# Patient Record
Sex: FEMALE | Race: WHITE | Employment: OTHER | URBAN - METROPOLITAN AREA
[De-identification: names, ages, dates, MRNs, and addresses within clinical notes are randomized per-mention and may not be internally consistent; named-entity substitution may affect disease eponyms.]

---

## 2017-10-09 ENCOUNTER — THERAPY VISIT (OUTPATIENT)
Dept: PHYSICAL THERAPY | Facility: CLINIC | Age: 67
End: 2017-10-09
Payer: MEDICARE

## 2017-10-09 DIAGNOSIS — M25.511 ACUTE PAIN OF RIGHT SHOULDER: Primary | ICD-10-CM

## 2017-10-09 PROCEDURE — G8984 CARRY CURRENT STATUS: HCPCS | Mod: GP | Performed by: PHYSICAL THERAPIST

## 2017-10-09 PROCEDURE — 97110 THERAPEUTIC EXERCISES: CPT | Mod: GP | Performed by: PHYSICAL THERAPIST

## 2017-10-09 PROCEDURE — G8985 CARRY GOAL STATUS: HCPCS | Mod: GP | Performed by: PHYSICAL THERAPIST

## 2017-10-09 PROCEDURE — 97161 PT EVAL LOW COMPLEX 20 MIN: CPT | Mod: GP | Performed by: PHYSICAL THERAPIST

## 2017-10-09 NOTE — LETTER
DEPARTMENT OF HEALTH AND HUMAN SERVICES  CENTERS FOR MEDICARE & MEDICAID SERVICES    PLAN/UPDATED PLAN OF PROGRESS FOR OUTPATIENT REHABILITATION    PATIENTS NAME:  Rika Connor     : 1950    PROVIDER NUMBER:    [id number]    [id number]     PROVIDER NAME: [place] [facility]    MEDICAL RECORD NUMBER: [id number]     START OF CARE DATE:  SOC Date: 10/09/17   TYPE:  PT    PRIMARY/TREATMENT DIAGNOSIS: (Pertinent Medical Diagnosis)  Acute pain of right shoulder    VISITS FROM START OF CARE:  Rxs Used: 1     [place] [facility] Initial Evaluation    Subjective:    Patient is a 67 year old female presenting with rehab right shoulder hpi. The history is provided by the patient. No  was used.   Rika Connor is a 67 year old female with a right shoulder condition.  Condition occurred with:  A fall.  Condition occurred: in the community.  This is a new condition  Pt presents to PT today with complaint of R shoulder pain, states she slipped and fell on the top stair. States she had a MRI done and has a tear in her rotator cuff. Per referral has a full thickness tear of the supraspinatus and partial tear of the infraspinatus tendon, R shoulder bursitis. Pt currently rating her pain at 2/10 level. She had an injection done on Friday of last week, states the injection helped some. She has pain putting on her coat, using her arm behind her. She is able to elevate her arm overhead if she moves slowly. She followed up with physician on 10-6-17 and was referred to PT for 1-2x/wk for 4-6 weeks. .    Patient reports pain:  Lateral and posterior.  Radiates to:  Shoulder and upper arm.  Pain is described as aching and sharp (with arm movement ) and is intermittent and reported as 2/10 and 5/10.  Associated symptoms:  Loss of motion/stiffness and loss of strength. Pain is worse in the A.M..  Symptoms are exacerbated by using arm behind back,  lifting, using arm overhead and certain positions and relieved by nothing (Reports pain is not that bad).  Since onset symptoms are gradually improving.  Special tests:  MRI.  Previous treatment includes other (Injection ).  There was mild improvement following previous treatment.  General health as reported by patient is good.  Pertinent medical history includes:  High blood pressure.  Medical allergies: yes (Penicillin).  Other surgeries include:  Other (Tonsils ).  Current medications:  None as reported by the patient.  Current occupation is Retired.    Primary job tasks include:  Driving, repetitive tasks and lifting.    Barriers include:  None as reported by the patient.  Red flags:  None as reported by the patient.              Objective:    Standing Alignment:    Cervical/Thoracic:  Forward head  Shoulder/UE:  Rounded shoulders         Shoulder Evaluation:  ROM:  AROM:    Flexion:  Left:  143    Right:  137  Abduction:  Left: 155   Right:  145  External Rotation:  Left:  78    Right:  78  Extension/Internal Rotation:  Left:  T7    Right:  T7      Strength:    Flexion: Left:5/5   Pain:    Right: 5-/5     Pain:   Abduction:  Left: 5/5  Pain:    Right: 5-/5     Pain:  Internal Rotation:  Left:5/5     Pain:    Right: 5/5     Pain:  External Rotation:   Left:5/5     Pain:   Right:5-/5     Pain:      Stability Testing:    Right shoulder stability negative testing:  Sulcus sign; Load and shift anterior and Load and shift posterior    Special Tests:    Right shoulder positive for the following special tests:Labral and Impingement  Right shoulder negative for the following special tests:Bursal; Rotator cuff tear and Acrimioclavicular    Palpation:    Right shoulder tenderness present at: Supraspinatus and Infraspinatus  Right shoulder tenderness not present at:Clavicle; Sternoclavicular; Acrimioclavicular; Biceps; Triceps; Teres Minor; Subscapularis; Deltoid; Levator; Rhomboids; Upper Trap or Bicipital  Groove    Mobility Tests:    Glenohumeral anterior right:  Normal  Glenohumeral posterior right:  Normal  Glenohumeral inferior right:  Normal    Scapulothoracic right:  Normal  Scapulohumeral rhythm right:  Normal         Assessment/Plan:      Patient is a 67 year old female with right side shoulder complaints.    Patient has the following significant findings with corresponding treatment plan.                Diagnosis 1:  R shoulder pain, rotator cuff tear   Pain -  hot/cold therapy, electric stimulation, manual therapy, self management, education, directional preference exercise and home program  Decreased ROM/flexibility - manual therapy, therapeutic exercise and home program  Decreased strength - therapeutic exercise, therapeutic activities and home program  Inflammation - cold therapy, electric stimulation and self management/home program  Impaired muscle performance - neuro re-education and home program  Decreased function - therapeutic activities and home program  Impaired posture - neuro re-education and home program    Therapy Evaluation Codes:   1) History comprised of:   Personal factors that impact the plan of care:      None.    Comorbidity factors that impact the plan of care are:      None.     Medications impacting care: None.  2) Examination of Body Systems comprised of:   Body structures and functions that impact the plan of care:      Shoulder.   Activity limitations that impact the plan of care are:      Driving, Lifting, Throwing and Reaching .  3) Clinical presentation characteristics are:   Stable/Uncomplicated.  4) Decision-Making    Low complexity using standardized patient assessment instrument and/or measureable assessment of functional outcome.  Cumulative Therapy Evaluation is: Low complexity.    Previous and current functional limitations:  (See Goal Flow Sheet for this information)    Short term and Long term goals: (See Goal Flow Sheet for this information)     Communication ability:  " Patient appears to be able to clearly communicate and understand verbal and written communication and follow directions correctly.  Treatment Explanation - The following has been discussed with the patient:   RX ordered/plan of care  Anticipated outcomes  Possible risks and side effects  This patient would benefit from PT intervention to resume normal activities.   Rehab potential is good.    Frequency:  2 X week, once daily  Duration:  for 6 weeks  Discharge Plan:  Achieve all LTG.  Independent in home treatment program.  Reach maximal therapeutic benefit.    Caregiver Signature/Credentials _____________________________ Date ________       Treating Provider: Rocky Chávez PT   I have reviewed and certified the need for these services and plan of treatment while under my care.        PHYSICIAN'S SIGNATURE:   _________________________________________  Date___________   James Lester MD    Certification period:  Beginning of Cert date period: 10/09/17 to  End of Cert period date: 01/06/18         Functional Level Progress Report: Please see attached \"Goal Flow sheet for Functional level.\"    ____X____ Continue Services or       ________ DC Services                Service dates: From  SOC Date: 10/09/17 date to present                         "

## 2017-10-09 NOTE — MR AVS SNAPSHOT
"              After Visit Summary   10/9/2017    Rika Connor    MRN: [id number]           Patient Information     Date Of Birth          1950        Visit Information        Provider Department      10/9/2017 3:10 PM Rocky Chávez PT [facility]        Today's Diagnoses     Acute pain of right shoulder    -  1       Follow-ups after your visit        Who to contact     If you have questions or need follow up information about today's clinic visit or your schedule please contact [facility] directly at [phone].  Normal or non-critical lab and imaging results will be communicated to you by Gazelle[employer], letter or phone within 4 business days after the clinic has received the results. If you do not hear from us within 7 days, please contact the clinic through Gazelle[employer] or phone. If you have a critical or abnormal lab result, we will notify you by phone as soon as possible.  Submit refill requests through [employer] or call your pharmacy and they will forward the refill request to us. Please allow 3 business days for your refill to be completed.          Additional Information About Your Visit        MyChart Information     [employer] lets you send messages to your doctor, view your test results, renew your prescriptions, schedule appointments and more. To sign up, go to [email or web address] . Click on \"Log in\" on the left side of the screen, which will take you to the Welcome page. Then click on \"Sign up Now\" on the right side of the page.     You will be asked to enter the access code listed below, as well as some personal information. Please follow the directions to create your username and password.     Your access code is: [email or web address]  Expires: 2018  3:53 PM     Your access code will  in 90 days. If you need help or a new code, please call your [place] clinic or [phone].        Care EveryWhere " ID     This is your Care EveryWhere ID. This could be used by other organizations to access your [place] medical records  [id number]         Blood Pressure from Last 3 Encounters:   No data found for BP    Weight from Last 3 Encounters:   No data found for Wt              We Performed the Following     HC PT EVAL, LOW COMPLEXITY     JUDI CERT REPORT     JUDI INITIAL EVAL REPORT     THERAPEUTIC EXERCISES        Primary Care Provider    None Specified       No primary provider on file.        Equal Access to Services     [facility]: Hadii aad ku hadasho Sokeithali, waaxda luqadaha, qaybta kaalmada adeegyada, waxay idiin hayaan adeeg pauljohnsontrevor laerickn . So [facility] [phone].    ATENCIÓN: Si habla español, tiene a verde disposición servicios gratuitos de asistencia lingüística. Llame al [phone].    We comply with applicable federal civil rights laws and [place] laws. We do not discriminate on the basis of race, color, national origin, age, disability, sex, sexual orientation, or gender identity.            Thank you!     Thank you for choosing [place] [facility]  for your care. Our goal is always to provide you with excellent care. Hearing back from our patients is one way we can continue to improve our services. Please take a few minutes to complete the written survey that you may receive in the mail after your visit with us. Thank you!             Your Updated Medication List - Protect others around you: Learn how to safely use, store and throw away your medicines at www.disposemymeds.org.      Notice  As of 10/9/2017  3:53 PM    You have not been prescribed any medications.

## 2017-10-09 NOTE — PROGRESS NOTES
[facility] Initial Evaluation      Subjective:    Patient is a 67 year old female presenting with rehab right shoulder hpi. The history is provided by the patient. No  was used.   Rika Connor is a 67 year old female with a right shoulder condition.  Condition occurred with:  A fall.  Condition occurred: in the community.  This is a new condition  Pt presents to PT today with complaint of R shoulder pain, states she slipped and fell on the top stair. States she had a MRI done and has a tear in her rotator cuff. Per referral has a full thickness tear of the supraspinatus and partial tear of the infraspinatus tendon, R shoulder bursitis. Pt currently rating her pain at 2/10 level. She had an injection done on Friday of last week, states the injection helped some. She has pain putting on her coat, using her arm behind her. She is able to elevate her arm overhead if she moves slowly. She followed up with physician on 10-6-17 and was referred to PT for 1-2x/wk for 4-6 weeks. .    Patient reports pain:  Lateral and posterior.  Radiates to:  Shoulder and upper arm.  Pain is described as aching and sharp (with arm movement ) and is intermittent and reported as 2/10 and 5/10.  Associated symptoms:  Loss of motion/stiffness and loss of strength. Pain is worse in the A.M..  Symptoms are exacerbated by using arm behind back, lifting, using arm overhead and certain positions and relieved by nothing (Reports pain is not that bad).  Since onset symptoms are gradually improving.  Special tests:  MRI.  Previous treatment includes other (Injection ).  There was mild improvement following previous treatment.  General health as reported by patient is good.  Pertinent medical history includes:  High blood pressure.  Medical allergies: yes (Penicillin).  Other surgeries include:  Other (Tonsils ).  Current medications:  None as reported by the patient.  Current occupation is Retired.     Primary job tasks include:  Driving, repetitive tasks and lifting.    Barriers include:  None as reported by the patient.    Red flags:  None as reported by the patient.                        Objective:    Standing Alignment:    Cervical/Thoracic:  Forward head  Shoulder/UE:  Rounded shoulders                                       Shoulder Evaluation:  ROM:  AROM:    Flexion:  Left:  143    Right:  137    Abduction:  Left: 155   Right:  145      External Rotation:  Left:  78    Right:  78            Extension/Internal Rotation:  Left:  T7    Right:  T7          Strength:    Flexion: Left:5/5   Pain:    Right: 5-/5     Pain:     Abduction:  Left: 5/5  Pain:    Right: 5-/5     Pain:    Internal Rotation:  Left:5/5     Pain:    Right: 5/5     Pain:  External Rotation:   Left:5/5     Pain:   Right:5-/5     Pain:            Stability Testing:        Right shoulder stability negative testing:  Sulcus sign; Load and shift anterior and Load and shift posterior  Special Tests:      Right shoulder positive for the following special tests:Labral and Impingement  Right shoulder negative for the following special tests:Bursal; Rotator cuff tear and Acrimioclavicular  Palpation:      Right shoulder tenderness present at: Supraspinatus and Infraspinatus  Right shoulder tenderness not present at:Clavicle; Sternoclavicular; Acrimioclavicular; Biceps; Triceps; Teres Minor; Subscapularis; Deltoid; Levator; Rhomboids; Upper Trap or Bicipital Groove  Mobility Tests:    Glenohumeral anterior right:  Normal  Glenohumeral posterior right:  Normal  Glenohumeral inferior right:  Normal      Scapulothoracic right:  Normal    Scapulohumeral rhythm right:  Normal                                   General     ROS    Assessment/Plan:      Patient is a 67 year old female with right side shoulder complaints.    Patient has the following significant findings with corresponding treatment plan.                Diagnosis 1:  R shoulder pain, rotator  cuff tear   Pain -  hot/cold therapy, electric stimulation, manual therapy, self management, education, directional preference exercise and home program  Decreased ROM/flexibility - manual therapy, therapeutic exercise and home program  Decreased strength - therapeutic exercise, therapeutic activities and home program  Inflammation - cold therapy, electric stimulation and self management/home program  Impaired muscle performance - neuro re-education and home program  Decreased function - therapeutic activities and home program  Impaired posture - neuro re-education and home program    Therapy Evaluation Codes:   1) History comprised of:   Personal factors that impact the plan of care:      None.    Comorbidity factors that impact the plan of care are:      None.     Medications impacting care: None.  2) Examination of Body Systems comprised of:   Body structures and functions that impact the plan of care:      Shoulder.   Activity limitations that impact the plan of care are:      Driving, Lifting, Throwing and Reaching .  3) Clinical presentation characteristics are:   Stable/Uncomplicated.  4) Decision-Making    Low complexity using standardized patient assessment instrument and/or measureable assessment of functional outcome.  Cumulative Therapy Evaluation is: Low complexity.    Previous and current functional limitations:  (See Goal Flow Sheet for this information)    Short term and Long term goals: (See Goal Flow Sheet for this information)     Communication ability:  Patient appears to be able to clearly communicate and understand verbal and written communication and follow directions correctly.  Treatment Explanation - The following has been discussed with the patient:   RX ordered/plan of care  Anticipated outcomes  Possible risks and side effects  This patient would benefit from PT intervention to resume normal activities.   Rehab potential is good.    Frequency:  2 X week, once daily  Duration:  for 6  weeks  Discharge Plan:  Achieve all LTG.  Independent in home treatment program.  Reach maximal therapeutic benefit.    Please refer to the daily flowsheet for treatment today, total treatment time and time spent performing 1:1 timed codes.

## 2017-10-16 ENCOUNTER — THERAPY VISIT (OUTPATIENT)
Dept: PHYSICAL THERAPY | Facility: CLINIC | Age: 67
End: 2017-10-16
Payer: MEDICARE

## 2017-10-16 DIAGNOSIS — M25.511 ACUTE PAIN OF RIGHT SHOULDER: ICD-10-CM

## 2017-10-16 PROCEDURE — 97112 NEUROMUSCULAR REEDUCATION: CPT | Mod: GP | Performed by: PHYSICAL THERAPIST

## 2017-10-16 PROCEDURE — 97110 THERAPEUTIC EXERCISES: CPT | Mod: GP | Performed by: PHYSICAL THERAPIST

## 2017-10-19 ENCOUNTER — THERAPY VISIT (OUTPATIENT)
Dept: PHYSICAL THERAPY | Facility: CLINIC | Age: 67
End: 2017-10-19
Payer: MEDICARE

## 2017-10-19 DIAGNOSIS — M25.511 ACUTE PAIN OF RIGHT SHOULDER: ICD-10-CM

## 2017-10-19 PROCEDURE — 97110 THERAPEUTIC EXERCISES: CPT | Mod: GP | Performed by: PHYSICAL THERAPIST

## 2017-10-19 PROCEDURE — 97112 NEUROMUSCULAR REEDUCATION: CPT | Mod: GP | Performed by: PHYSICAL THERAPIST

## 2017-10-23 ENCOUNTER — THERAPY VISIT (OUTPATIENT)
Dept: PHYSICAL THERAPY | Facility: CLINIC | Age: 67
End: 2017-10-23
Payer: MEDICARE

## 2017-10-23 DIAGNOSIS — M25.511 ACUTE PAIN OF RIGHT SHOULDER: ICD-10-CM

## 2017-10-23 PROCEDURE — 97112 NEUROMUSCULAR REEDUCATION: CPT | Mod: GP | Performed by: PHYSICAL THERAPIST

## 2017-10-23 PROCEDURE — 97110 THERAPEUTIC EXERCISES: CPT | Mod: GP | Performed by: PHYSICAL THERAPIST

## 2017-10-23 PROCEDURE — 97140 MANUAL THERAPY 1/> REGIONS: CPT | Mod: GP | Performed by: PHYSICAL THERAPIST

## 2017-10-26 ENCOUNTER — THERAPY VISIT (OUTPATIENT)
Dept: PHYSICAL THERAPY | Facility: CLINIC | Age: 67
End: 2017-10-26
Payer: MEDICARE

## 2017-10-26 DIAGNOSIS — M25.511 ACUTE PAIN OF RIGHT SHOULDER: ICD-10-CM

## 2017-10-26 PROCEDURE — 97140 MANUAL THERAPY 1/> REGIONS: CPT | Mod: GP | Performed by: PHYSICAL THERAPIST

## 2017-10-26 PROCEDURE — 97110 THERAPEUTIC EXERCISES: CPT | Mod: GP | Performed by: PHYSICAL THERAPIST

## 2017-10-31 ENCOUNTER — THERAPY VISIT (OUTPATIENT)
Dept: PHYSICAL THERAPY | Facility: CLINIC | Age: 67
End: 2017-10-31
Payer: MEDICARE

## 2017-10-31 DIAGNOSIS — M25.511 ACUTE PAIN OF RIGHT SHOULDER: ICD-10-CM

## 2017-10-31 PROCEDURE — 97110 THERAPEUTIC EXERCISES: CPT | Mod: GP | Performed by: PHYSICAL THERAPIST

## 2017-10-31 PROCEDURE — 97140 MANUAL THERAPY 1/> REGIONS: CPT | Mod: GP | Performed by: PHYSICAL THERAPIST

## 2017-11-06 ENCOUNTER — THERAPY VISIT (OUTPATIENT)
Dept: PHYSICAL THERAPY | Facility: CLINIC | Age: 67
End: 2017-11-06
Payer: MEDICARE

## 2017-11-06 DIAGNOSIS — M25.511 ACUTE PAIN OF RIGHT SHOULDER: ICD-10-CM

## 2017-11-06 PROCEDURE — 97112 NEUROMUSCULAR REEDUCATION: CPT | Mod: GP | Performed by: PHYSICAL THERAPIST

## 2017-11-06 PROCEDURE — 97110 THERAPEUTIC EXERCISES: CPT | Mod: GP | Performed by: PHYSICAL THERAPIST

## 2017-11-06 PROCEDURE — 97140 MANUAL THERAPY 1/> REGIONS: CPT | Mod: GP | Performed by: PHYSICAL THERAPIST

## 2017-11-13 ENCOUNTER — THERAPY VISIT (OUTPATIENT)
Dept: PHYSICAL THERAPY | Facility: CLINIC | Age: 67
End: 2017-11-13
Payer: MEDICARE

## 2017-11-13 DIAGNOSIS — M25.511 ACUTE PAIN OF RIGHT SHOULDER: ICD-10-CM

## 2017-11-13 PROCEDURE — 97110 THERAPEUTIC EXERCISES: CPT | Mod: GP | Performed by: PHYSICAL THERAPIST

## 2017-11-13 PROCEDURE — 97112 NEUROMUSCULAR REEDUCATION: CPT | Mod: GP | Performed by: PHYSICAL THERAPIST

## 2017-11-13 PROCEDURE — 97140 MANUAL THERAPY 1/> REGIONS: CPT | Mod: GP | Performed by: PHYSICAL THERAPIST

## 2017-11-20 ENCOUNTER — THERAPY VISIT (OUTPATIENT)
Dept: PHYSICAL THERAPY | Facility: CLINIC | Age: 67
End: 2017-11-20
Payer: MEDICARE

## 2017-11-20 DIAGNOSIS — M25.511 ACUTE PAIN OF RIGHT SHOULDER: ICD-10-CM

## 2017-11-20 PROCEDURE — 97110 THERAPEUTIC EXERCISES: CPT | Mod: GP | Performed by: PHYSICAL THERAPIST

## 2017-11-20 PROCEDURE — 97112 NEUROMUSCULAR REEDUCATION: CPT | Mod: GP | Performed by: PHYSICAL THERAPIST

## 2017-11-20 PROCEDURE — 97140 MANUAL THERAPY 1/> REGIONS: CPT | Mod: GP | Performed by: PHYSICAL THERAPIST

## 2017-11-27 ENCOUNTER — THERAPY VISIT (OUTPATIENT)
Dept: PHYSICAL THERAPY | Facility: CLINIC | Age: 67
End: 2017-11-27
Payer: MEDICARE

## 2017-11-27 DIAGNOSIS — M25.511 ACUTE PAIN OF RIGHT SHOULDER: ICD-10-CM

## 2017-11-27 PROCEDURE — 97112 NEUROMUSCULAR REEDUCATION: CPT | Mod: GP | Performed by: PHYSICAL THERAPIST

## 2017-11-27 PROCEDURE — 97110 THERAPEUTIC EXERCISES: CPT | Mod: GP | Performed by: PHYSICAL THERAPIST

## 2017-11-27 PROCEDURE — 97140 MANUAL THERAPY 1/> REGIONS: CPT | Mod: GP | Performed by: PHYSICAL THERAPIST

## 2017-11-27 PROCEDURE — G8985 CARRY GOAL STATUS: HCPCS | Mod: GP | Performed by: PHYSICAL THERAPIST

## 2017-11-27 PROCEDURE — G8984 CARRY CURRENT STATUS: HCPCS | Mod: GP | Performed by: PHYSICAL THERAPIST

## 2017-11-27 NOTE — LETTER
[facility]  [address]  [facility] [id number] [phone]    2017    Re: Rika Connor   :   1950  MRN:  [id number]   REFERRING PHYSICIAN:   James Lester    [facility]  Date of Initial Evaluation:  10/9/17  Visits:  Rxs Used: 10  Reason for Referral:  Acute pain of right shoulder    PROGRESS  REPORT  Progress reporting period is from 10/9/17 to 17.       SUBJECTIVE  Subjective changes noted by patient:  felt good for a couple of days after last rx, still has pain with pushing back witah R arm and doing some quick mov't/lifting motions of R arm to shoulder height, will get some soreness in neck if her shoulder gets sore, now able to sleep on R side, will wake on occasion, no pain with dressing,     Current Pain level: 3/10.     Previous pain level was  NA Initial Pain level:  (2-5/10).   Changes in function:  Yes (See Goal flowsheet attached for changes in current functional level)  Adverse reaction to treatment or activity: activity - quick mov't of R arm, lifting too much with R arm    OBJECTIVE  Changes noted in objective findings:    Objective: AROM: shoulder 160, abd 160, ER(0) 65, IR(0) T9, MMT: shoulder abd 4/5, ER 4+/5, IR 5/5, mild tightness B pec minor, + CRLF R, + neers,  - heart, + corocoid, + cross arm, + full can, - empty can     ASSESSMENT/PLAN  Updated problem list and treatment plan: Diagnosis 1:  R shoulder pain consistent with impingement with possible bone spur  Pain -  hot/cold therapy, manual therapy, self management, education and home program  Decreased ROM/flexibility - manual therapy, therapeutic exercise and home program  Decreased strength - therapeutic exercise, therapeutic activities and home program  Decreased function - therapeutic activities and home program  Impaired posture - neuro re-education and home program  STG/LTGs  have been met or progress has been made towards goals:  Yes (See Goal flow sheet completed today.)  Assessment of Progress: The patient's condition is improving.  Self Management Plans:  Patient has been instructed in a home treatment program.  I have re-evaluated this patient and find that the nature, scope, duration and intensity of the therapy is appropriate for the medical condition of the patient.  Rika continues to require the following intervention to meet STG and LTG's:  PT    Recommendations:  This patient would benefit from continued therapy.     Frequency:  1 X week, once daily  Duration:  for 6 weeks        Thank you for your referral.    INQUIRIES  Therapist: Jax Lundy,PT, DPT, [facility]    [facility]  [address] [place], [place] [address]  [facility] [address]  Phone: [phone]  Fax: [phone]

## 2017-11-27 NOTE — PROGRESS NOTES
Subjective:    HPI                    Objective:    System    Physical Exam    General     ROS    Assessment/Plan:      PROGRESS  REPORT    Progress reporting period is from 10/9/17 to 11/27/17.       SUBJECTIVE  Subjective changes noted by patient:  felt good for a couple of days after last rx, still has pain with pushing back witah R arm and doing some quick mov't/lifting motions of R arm to shoulder height, will get some soreness in neck if her shoulder gets sore, now able to sleep on R side, will wake on occasion, no pain with dressing,     Current Pain level: 3/10.     Previous pain level was  NA Initial Pain level:  (2-5/10).   Changes in function:  Yes (See Goal flowsheet attached for changes in current functional level)  Adverse reaction to treatment or activity: activity - quick mov't of R arm, lifting too much with R arm    OBJECTIVE  Changes noted in objective findings:    Objective: AROM: shoulder 160, abd 160, ER(0) 65, IR(0) T9, MMT: shoulder abd 4/5, ER 4+/5, IR 5/5, mild tightness B pec minor, + CRLF R, + neers,  - heart, + corocoid, + cross arm, + full can, - empty can     ASSESSMENT/PLAN  Updated problem list and treatment plan: Diagnosis 1:  R shoulder pain consistent with impingement with possible bone spur    Pain -  hot/cold therapy, manual therapy, self management, education and home program  Decreased ROM/flexibility - manual therapy, therapeutic exercise and home program  Decreased strength - therapeutic exercise, therapeutic activities and home program  Decreased function - therapeutic activities and home program  Impaired posture - neuro re-education and home program  STG/LTGs have been met or progress has been made towards goals:  Yes (See Goal flow sheet completed today.)  Assessment of Progress: The patient's condition is improving.  Self Management Plans:  Patient has been instructed in a home treatment program.  I have re-evaluated this patient and find that the nature, scope,  duration and intensity of the therapy is appropriate for the medical condition of the patient.  Rika continues to require the following intervention to meet STG and LTG's:  PT    Recommendations:  This patient would benefit from continued therapy.     Frequency:  1 X week, once daily  Duration:  for 6 weeks          Please refer to the daily flowsheet for treatment today, total treatment time and time spent performing 1:1 timed codes.        Jax Lundy,PT, DPT, OCS

## 2017-12-04 ENCOUNTER — THERAPY VISIT (OUTPATIENT)
Dept: PHYSICAL THERAPY | Facility: CLINIC | Age: 67
End: 2017-12-04
Payer: MEDICARE

## 2017-12-04 DIAGNOSIS — M25.511 ACUTE PAIN OF RIGHT SHOULDER: ICD-10-CM

## 2017-12-04 PROCEDURE — 97140 MANUAL THERAPY 1/> REGIONS: CPT | Mod: GP | Performed by: PHYSICAL THERAPIST

## 2017-12-04 PROCEDURE — 97112 NEUROMUSCULAR REEDUCATION: CPT | Mod: GP | Performed by: PHYSICAL THERAPIST

## 2017-12-04 PROCEDURE — 97110 THERAPEUTIC EXERCISES: CPT | Mod: GP | Performed by: PHYSICAL THERAPIST

## 2017-12-11 ENCOUNTER — THERAPY VISIT (OUTPATIENT)
Dept: PHYSICAL THERAPY | Facility: CLINIC | Age: 67
End: 2017-12-11
Payer: MEDICARE

## 2017-12-11 DIAGNOSIS — M25.511 ACUTE PAIN OF RIGHT SHOULDER: ICD-10-CM

## 2017-12-11 PROCEDURE — 97112 NEUROMUSCULAR REEDUCATION: CPT | Mod: GP | Performed by: PHYSICAL THERAPIST

## 2017-12-11 PROCEDURE — 97110 THERAPEUTIC EXERCISES: CPT | Mod: GP | Performed by: PHYSICAL THERAPIST

## 2017-12-11 PROCEDURE — 97140 MANUAL THERAPY 1/> REGIONS: CPT | Mod: GP | Performed by: PHYSICAL THERAPIST

## 2017-12-18 ENCOUNTER — THERAPY VISIT (OUTPATIENT)
Dept: PHYSICAL THERAPY | Facility: CLINIC | Age: 67
End: 2017-12-18
Payer: MEDICARE

## 2017-12-18 DIAGNOSIS — M25.511 ACUTE PAIN OF RIGHT SHOULDER: ICD-10-CM

## 2017-12-18 PROCEDURE — 97140 MANUAL THERAPY 1/> REGIONS: CPT | Mod: GP | Performed by: PHYSICAL THERAPIST

## 2017-12-18 PROCEDURE — 97110 THERAPEUTIC EXERCISES: CPT | Mod: GP | Performed by: PHYSICAL THERAPIST

## 2017-12-18 PROCEDURE — 97112 NEUROMUSCULAR REEDUCATION: CPT | Mod: GP | Performed by: PHYSICAL THERAPIST

## 2017-12-26 ENCOUNTER — THERAPY VISIT (OUTPATIENT)
Dept: PHYSICAL THERAPY | Facility: CLINIC | Age: 67
End: 2017-12-26
Payer: MEDICARE

## 2017-12-26 DIAGNOSIS — M25.511 ACUTE PAIN OF RIGHT SHOULDER: ICD-10-CM

## 2017-12-26 PROCEDURE — 97112 NEUROMUSCULAR REEDUCATION: CPT | Mod: GP | Performed by: PHYSICAL THERAPIST

## 2017-12-26 PROCEDURE — 97110 THERAPEUTIC EXERCISES: CPT | Mod: GP | Performed by: PHYSICAL THERAPIST

## 2017-12-26 PROCEDURE — 97140 MANUAL THERAPY 1/> REGIONS: CPT | Mod: GP | Performed by: PHYSICAL THERAPIST

## 2018-01-05 ENCOUNTER — THERAPY VISIT (OUTPATIENT)
Dept: PHYSICAL THERAPY | Facility: CLINIC | Age: 68
End: 2018-01-05
Payer: MEDICARE

## 2018-01-05 DIAGNOSIS — M25.511 ACUTE PAIN OF RIGHT SHOULDER: ICD-10-CM

## 2018-01-05 PROCEDURE — 97112 NEUROMUSCULAR REEDUCATION: CPT | Mod: GP | Performed by: PHYSICAL THERAPIST

## 2018-01-05 PROCEDURE — 97110 THERAPEUTIC EXERCISES: CPT | Mod: GP | Performed by: PHYSICAL THERAPIST

## 2018-01-05 NOTE — MR AVS SNAPSHOT
After Visit Summary   1/5/2018    Rika Connor    MRN: [id number]           Patient Information     Date Of Birth          1950        Visit Information        Provider Department      1/5/2018 9:40 AM Jax Lundy, PT [facility] Physical Therapy        Today's Diagnoses     Acute pain of right shoulder           Follow-ups after your visit        Your next 10 appointments already scheduled     [place] 15, 2018  9:40 AM CST   JUDI Extremity with Jax Lundy PT   [facility] Physical Therapy ([facility]  )    [address]  [facility] [address]   [phone]            Jan 22, 2018  9:40 AM CST   JUDI Extremity with Jax uLndy PT   [facility] Physical Therapy ([facility]  )    [address]  [facility] [address]   [phone]            Jan 29, 2018  9:40 AM CST   JUDI Extremity with Jax Lundy PT   [facility] ([facility]  )    [address]  [facility] [address]   [phone]              Who to contact     If you have questions or need follow up information about today's clinic visit or your schedule please contact [facility] PHYSICAL THERAPY directly at [phone].  Normal or non-critical lab and imaging results will be communicated to you by MyChart, letter or phone within 4 business days after the clinic has received the results. If you do not hear from us within 7 days, please contact the clinic through MyChart or phone. If you have a critical or abnormal lab result, we will notify you by phone as soon as possible.  Submit refill requests through [employer] or call your pharmacy and they will forward the refill request to us. Please allow 3 business days for your refill to be completed.          Additional Information About Your  "Visit        [employer] Information     [employer] lets you send messages to your doctor, view your test results, renew your prescriptions, schedule appointments and more. To sign up, go to [email or web address] . Click on \"Log in\" on the left side of the screen, which will take you to the Welcome page. Then click on \"Sign up Now\" on the right side of the page.     You will be asked to enter the access code listed below, as well as some personal information. Please follow the directions to create your username and password.     Your access code is: [email or web address]  Expires: 2018  2:53 PM     Your access code will  in 90 days. If you need help or a new code, please call your [place] clinic or [phone].        Care EveryWhere ID     This is your Care EveryWhere ID. This could be used by other organizations to access your [place] medical records  [id number]         Blood Pressure from Last 3 Encounters:   No data found for BP    Weight from Last 3 Encounters:   No data found for Wt              We Performed the Following     NEUROMUSCULAR RE-EDUCATION     THERAPEUTIC EXERCISES        Primary Care Provider Fax #    [facility] [phone]       [address] [place] [address]        Equal Access to Services     LONDON LEA : Hadii clair banueloso Sokeithali, waaxda luqadaha, qaybta kaalmada adeegyada, negro izquierdo. So [facility] [phone].    ATENCIÓN: Si habla español, tiene a verde disposición servicios gratuitos de asistencia lingüística. Llame al [phone].    We comply with applicable federal civil rights laws and [place] laws. We do not discriminate on the basis of race, color, national origin, age, disability, sex, sexual orientation, or gender identity.            Thank you!     Thank you for choosing [facility] PHYSICAL THERAPY  for your care. Our goal is always to provide you with excellent care. Hearing back from " our patients is one way we can continue to improve our services. Please take a few minutes to complete the written survey that you may receive in the mail after your visit with us. Thank you!             Your Updated Medication List - Protect others around you: Learn how to safely use, store and throw away your medicines at www.disposemymeds.org.      Notice  As of 1/5/2018 10:27 AM    You have not been prescribed any medications.

## 2018-01-05 NOTE — LETTER
DEPARTMENT OF HEALTH AND HUMAN SERVICES  CENTERS FOR MEDICARE & MEDICAID SERVICES    PLAN/UPDATED PLAN OF PROGRESS FOR OUTPATIENT REHABILITATION    PATIENTS NAME:  Rika Connor     : 1950    PROVIDER NUMBER:    [id number]    [id number]     PROVIDER NAME: [place] [facility] PHYSICAL THERAPY    MEDICAL RECORD NUMBER: [id number]     START OF CARE DATE:  SOC Date: 10/09/17   TYPE:  PT    PRIMARY/TREATMENT DIAGNOSIS: (Pertinent Medical Diagnosis)  Acute pain of right shoulder    VISITS FROM START OF CARE:  Rxs Used: 15     PROGRESS  REPORT  Progress reporting period is from 17 to 18.       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     Previous pain level was  3-7/10 Initial Pain level:  (2-5/10).   Changes in function:  Yes (See Goal flowsheet attached for changes in current functional level)  Adverse reaction to treatment or activity: activity - doing too much lifting (especially with arm away from her body)    OBJECTIVE  Changes noted in objective findings:    Objective: PROM wnl, AROM: flex 165, abd 172, ER(0) 66, IR(0) T12,   MMT: abd 5-/5 (++), ER 5/5 ,IR 5/5  + full can R, - bursa, - neers, - cross arm, mild hypomobile post and inf capsule R GHJ     ASSESSMENT/PLAN  Updated problem list and treatment plan: Diagnosis 1:  R shoulder pain consistent with partial tear of supraspinatus    Pain -  hot/cold therapy, manual therapy, self management, education and home program  Decreased strength - therapeutic exercise, therapeutic activities and home program  Impaired posture - neuro re-education and home program  STG/LTGs have been met or progress has been made  "towards goals:  Yes (See Goal flow sheet completed today.)  Assessment of Progress: The patient's condition is improving slowly.  Continues to be limited when she increases her activity level with her R arm.  Self Management Plans:  Patient has been instructed in a home treatment program.  I have re-evaluated this patient and find that the nature, scope, duration and intensity of the therapy is appropriate for the medical condition of the patient.  Rika continues to require the following intervention to meet STG and LTG's:  PT    Recommendations:  This patient would benefit from continued therapy.     Frequency:  1 X week, once daily  Duration:  for 2 weeks    Caregiver Signature/Credentials _____________________________ Date ________       Treating Provider: Jax Lundy DPT, OCS   I have reviewed and certified the need for these services and plan of treatment while under my care.        PHYSICIAN'S SIGNATURE:   _________________________________________  Date___________   James Lester MD    Certification period:  Beginning of Cert date period: 01/06/18 to  End of Cert period date: 04/04/18     Functional Level Progress Report: Please see attached \"Goal Flow sheet for Functional level.\"    ____X____ Continue Services or       ________ DC Services                Service dates: From  SOC Date: 10/09/17 date to present                         "

## 2018-01-11 NOTE — PROGRESS NOTES
Subjective:  HPI                    Objective:  System    Physical Exam    General     ROS    Assessment/Plan:    PROGRESS  REPORT    Progress reporting period is from 11/27/17 to 1/5/18.       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     Previous pain level was  3-7/10 Initial Pain level:  (2-5/10).   Changes in function:  Yes (See Goal flowsheet attached for changes in current functional level)  Adverse reaction to treatment or activity: activity - doing too much lifting (especially with arm away from her body)    OBJECTIVE  Changes noted in objective findings:    Objective: PROM wnl, AROM: flex 165, abd 172, ER(0) 66, IR(0) T12,   MMT: abd 5-/5 (++), ER 5/5 ,IR 5/5  + full can R, - bursa, - neers, - cross arm, mild hypomobile post and inf capsule R GHJ     ASSESSMENT/PLAN  Updated problem list and treatment plan: Diagnosis 1:  R shoulder pain consistent with partial tear of supraspinatus    Pain -  hot/cold therapy, manual therapy, self management, education and home program  Decreased strength - therapeutic exercise, therapeutic activities and home program  Impaired posture - neuro re-education and home program  STG/LTGs have been met or progress has been made towards goals:  Yes (See Goal flow sheet completed today.)  Assessment of Progress: The patient's condition is improving slowly.  Continues to be limited when she increases her activity level with her R arm.  Self Management Plans:  Patient has been instructed in a home treatment program.  I have re-evaluated this patient and find that the nature, scope, duration and intensity of the therapy is appropriate for the medical condition of the  patient.  Rika continues to require the following intervention to meet STG and LTG's:  PT    Recommendations:  This patient would benefit from continued therapy.     Frequency:  1 X week, once daily  Duration:  for 2 weeks          Please refer to the daily flowsheet for treatment today, total treatment time and time spent performing 1:1 timed codes.      Jax Lundy,PT, DPT, OCS

## 2018-01-15 ENCOUNTER — THERAPY VISIT (OUTPATIENT)
Dept: PHYSICAL THERAPY | Facility: CLINIC | Age: 68
End: 2018-01-15
Payer: MEDICARE

## 2018-01-15 DIAGNOSIS — M25.511 ACUTE PAIN OF RIGHT SHOULDER: ICD-10-CM

## 2018-01-15 PROCEDURE — 97110 THERAPEUTIC EXERCISES: CPT | Mod: GP | Performed by: PHYSICAL THERAPIST

## 2018-01-15 PROCEDURE — 97112 NEUROMUSCULAR REEDUCATION: CPT | Mod: GP | Performed by: PHYSICAL THERAPIST

## 2018-02-28 ENCOUNTER — THERAPY VISIT (OUTPATIENT)
Dept: PHYSICAL THERAPY | Facility: CLINIC | Age: 68
End: 2018-02-28
Payer: MEDICARE

## 2018-02-28 DIAGNOSIS — M25.511 ACUTE PAIN OF RIGHT SHOULDER: ICD-10-CM

## 2018-02-28 PROCEDURE — 97530 THERAPEUTIC ACTIVITIES: CPT | Mod: GP | Performed by: PHYSICAL THERAPIST

## 2018-02-28 NOTE — MR AVS SNAPSHOT
After Visit Summary   2/28/2018    Rika Connor    MRN: [id number]           Patient Information     Date Of Birth          1950        Visit Information        Provider Department      2/28/2018 4:30 PM Rocky Chávez, PT [place] [facility]        Today's Diagnoses     Acute pain of right shoulder           Follow-ups after your visit        Your next 10 appointments already scheduled     Mar 13, 2018  3:50 PM CDT   (Arrive by 3:35 PM)   JUDI Extremity with Jax Lundy, PT   [place] [facility] ([facility]  )    [address]  [place] [address]   [phone]            Mar 16, 2018 11:40 AM CDT   JUDI Extremity with Jax Lundy PT   [place] [facility]  [facility] ([facility]  )    [address]  [place] [address]   [phone]            Mar 19, 2018  2:30 PM CDT   JUDI Extremity with Rocky Chávez PT   [place] [facility] ([facility]  )    [address]  [place] [address]   [phone]            Mar 22, 2018 12:50 PM CDT   JUDI Extremity with Rocky Chávez PT   [place] [facility] ([facility]  )    [address]  [place] [address]   [phone]            Mar 26, 2018 11:40 AM CDT   JUDI Extremity with Jax Lundy PT   [place] [facility]  [facility] ([facility]  )    [address]  [place] [address]   [phone]            Mar 29, 2018 11:20 AM CDT   JUDI Extremity with Jax Lundy PT   [place] [facility]  [facility] ([facility]  )    [address]  [place] [address]   [phone]              Who to contact     If you have questions or need follow up information about today's clinic visit  "or your schedule please contact [facility] PH[facility]ICAL [facility] directly at [phone].  Normal or non-critical lab and imaging results will be communicated to you by [employer], letter or phone within 4 business days after the clinic has received the results. If you do not hear from us within 7 days, please contact the clinic through MyChart or phone. If you have a critical or abnormal lab result, we will notify you by phone as soon as possible.  Submit refill requests through [employer] or call your pharmacy and they will forward the refill request to us. Please allow 3 business days for your refill to be completed.          Additional Information About Your Visit        [employer] Information     [employer] lets you send messages to your doctor, view your test results, renew your prescriptions, schedule appointments and more. To sign up, go to [email or web address] . Click on \"Log in\" on the left side of the screen, which will take you to the Welcome page. Then click on \"Sign up Now\" on the right side of the page.     You will be asked to enter the access code listed below, as well as some personal information. Please follow the directions to create your username and password.     Your access code is: [email or web address]  Expires: 4/15/2018 11:50 AM     Your access code will  in 90 days. If you need help or a new code, please call your [place] clinic or [phone].        Care EveryWhere ID     This is your Care EveryWhere ID. This could be used by other organizations to access your [place] medical records  [id number]         Blood Pressure from Last 3 Encounters:   No data found for BP    Weight from Last 3 Encounters:   No data found for Wt              We Performed the Following     THERAPEUTIC ACTIVITIES        Primary Care Provider Fax #    [facility] [phone]       [address] [place] [address]        Equal Access to Services     LONDON LEA AH: " Hadii clair Sigalaali, wachuyitada luqadaha, jerod kahima walker, negro laquita myrandasoraya langefelicia paulnavjot laericksoraya timbo. So [facility] [phone].    ATENCIÓN: Si habla español, tiene a verde disposición servicios gratuitos de asistencia lingüística. Llame al [phone].    We comply with applicable federal civil rights laws and [place] laws. We do not discriminate on the basis of race, color, national origin, age, disability, sex, sexual orientation, or gender identity.            Thank you!     Thank you for choosing [facility] PHYSICAL [facility]  for your care. Our goal is always to provide you with excellent care. Hearing back from our patients is one way we can continue to improve our services. Please take a few minutes to complete the written survey that you may receive in the mail after your visit with us. Thank you!             Your Updated Medication List - Protect others around you: Learn how to safely use, store and throw away your medicines at www.disposemymeds.org.      Notice  As of 2/28/2018  5:12 PM    You have not been prescribed any medications.

## 2018-03-13 ENCOUNTER — THERAPY VISIT (OUTPATIENT)
Dept: PHYSICAL THERAPY | Facility: CLINIC | Age: 68
End: 2018-03-13
Payer: MEDICARE

## 2018-03-13 DIAGNOSIS — Z98.890 S/P ROTATOR CUFF REPAIR: ICD-10-CM

## 2018-03-13 DIAGNOSIS — M25.511 ACUTE PAIN OF RIGHT SHOULDER: Primary | ICD-10-CM

## 2018-03-13 PROCEDURE — G8984 CARRY CURRENT STATUS: HCPCS | Mod: GP | Performed by: PHYSICAL THERAPIST

## 2018-03-13 PROCEDURE — 97161 PT EVAL LOW COMPLEX 20 MIN: CPT | Mod: GP | Performed by: PHYSICAL THERAPIST

## 2018-03-13 PROCEDURE — G8985 CARRY GOAL STATUS: HCPCS | Mod: GP | Performed by: PHYSICAL THERAPIST

## 2018-03-13 PROCEDURE — 97110 THERAPEUTIC EXERCISES: CPT | Mod: GP | Performed by: PHYSICAL THERAPIST

## 2018-03-13 NOTE — MR AVS SNAPSHOT
After Visit Summary   3/13/2018    Rika Connor    MRN: [id number]           Patient Information     Date Of Birth          1950        Visit Information        Provider Department      3/13/2018 3:50 PM Jax Lundy, PT [facility] Physical Therapy        Today's Diagnoses     Acute pain of right shoulder    -  1    S/P rotator cuff repair           Follow-ups after your visit        Your next 10 appointments already scheduled     Mar 16, 2018  2:50 PM CDT   JUDI Extremity with Jax Lundy PT   [place] [facility] ([facility]  )    [address]  [facility] [address]   [phone]            Mar 19, 2018  2:30 PM CDT   JUDI Extremity with Rocky Chávez PT   [place] [facility] Physical Therapy ([facility]  )    [address]  [place] [address]   [phone]            Mar 22, 2018 12:50 PM CDT   JUDI Extremity with Rocky Chávez, PT   [facility] Therapy ([facility]  )    [address]  [facility] [address]   [phone]            Mar 26, 2018 11:40 AM CDT   JUDI Extremity with Jax Lundy PT   [facility] Physical Therapy ([facility]  )    [address]  [facility] [address]   [phone]            Mar 29, 2018 11:20 AM CDT   JUDI Extremity with Jax Lundy PT   [facility] Physical Therapy ([facility]  )    [address]  [facility] [address]   [phone]              Who to contact     If you have questions or need follow up information about today's clinic visit or your schedule please contact [place] [facility] PHYSICAL THERAPY directly at [phone].  Normal or non-critical lab and imaging results will be communicated to you by MyChart, letter  "or phone within 4 business days after the clinic has received the results. If you do not hear from us within 7 days, please contact the clinic through [employer] or phone. If you have a critical or abnormal lab result, we will notify you by phone as soon as possible.  Submit refill requests through [employer] or call your pharmacy and they will forward the refill request to us. Please allow 3 business days for your refill to be completed.          Additional Information About Your Visit        [employer] Information     [employer] lets you send messages to your doctor, view your test results, renew your prescriptions, schedule appointments and more. To sign up, go to [email or web address] . Click on \"Log in\" on the left side of the screen, which will take you to the Welcome page. Then click on \"Sign up Now\" on the right side of the page.     You will be asked to enter the access code listed below, as well as some personal information. Please follow the directions to create your username and password.     Your access code is: [email or web address]  Expires: 4/15/2018 12:50 PM     Your access code will  in 90 days. If you need help or a new code, please call your [place] clinic or [phone].        Care EveryWhere ID     This is your Care EveryWhere ID. This could be used by other organizations to access your [place] medical records  [id number]         Blood Pressure from Last 3 Encounters:   No data found for BP    Weight from Last 3 Encounters:   No data found for Wt              We Performed the Following     HC PT EVAL, LOW COMPLEXITY     JUDI CERT REPORT     THERAPEUTIC EXERCISES        Primary Care Provider Fax #    [facility] [phone] [address] [facility] [address]        Equal Access to Services     LONDON LEA : Haddinorah Jarvis, nikita blank, negro rodriguez. So [facility] [phone].    ATENCIÓN: Si habla " español, tiene a verde disposición servicios gratuitos de asistencia lingüística. Ward lebron [phone].    We comply with applicable federal civil rights laws and [place] laws. We do not discriminate on the basis of race, color, national origin, age, disability, sex, sexual orientation, or gender identity.            Thank you!     Thank you for choosing [place] [facility] PHYSICAL [facility]  for your care. Our goal is always to provide you with excellent care. Hearing back from our patients is one way we can continue to improve our services. Please take a few minutes to complete the written survey that you may receive in the mail after your visit with us. Thank you!             Your Updated Medication List - Protect others around you: Learn how to safely use, store and throw away your medicines at www.disposemymeds.org.      Notice  As of 3/13/2018  4:43 PM    You have not been prescribed any medications.

## 2018-03-13 NOTE — LETTER
DEPARTMENT OF HEALTH AND HUMAN SERVICES  CENTERS FOR MEDICARE & MEDICAID SERVICES    PLAN/UPDATED PLAN OF PROGRESS FOR OUTPATIENT REHABILITATION    PATIENTS NAME:  Rika Connor     : 1950    PROVIDER NUMBER:    [id number]    [id number]     PROVIDER NAME: [facility]IC [facility] [place] [facility]    MEDICAL RECORD NUMBER: [id number]     START OF CARE DATE:  SOC Date: 18   TYPE:  PT    PRIMARY/TREATMENT DIAGNOSIS: (Pertinent Medical Diagnosis)     Acute pain of right shoulder  S/P rotator cuff repair    VISITS FROM START OF CARE:  Rxs Used: 1     [facility] Initial Evaluation  Subjective:  Patient is a 67 year old female presenting with rehab right shoulder hpi. The history is provided by the patient. No  was used.   Rika Connor is a 67 year old female with a right shoulder condition.  Condition occurred with:  Repetition/overuse.  Condition occurred: at home.  This is a new condition  S/p R rotator cuff repair (large tear) with decompression, DCE, bicep tenodesis on 3/6/18.  Now taking extra strength Tylenol for pain control.  Sleeping in recliner.  Had stitches removed yesterday.  Hasn't started the codman's exercise yet.  Doing some AROM for R elbow. Icing with cryocuff 2-3x/day. .    Patient reports pain:  Upper arm, in the joint, anterior, posterior, lateral and medial.  Radiates to:  Upper arm.  Pain is described as sharp, stabbing and aching and is intermittent and reported as 9/10.  Associated symptoms:  Loss of motion/stiffness, loss of strength and edema. Pain is the same all the time.  Symptoms are exacerbated by using arm overhead, using arm at shoulder level, carrying, certain positions, using arm behind back, lifting and lying on extremity and relieved by ice.  Since onset symptoms are gradually improving.    Previous treatment includes physical therapy.  There was moderate improvement following previous  treatment.  General health as reported by patient is good.  Pertinent medical history includes:  Osteoarthritis, overweight and menopausal.  Medical allergies: yes (penecillin).  Other surgeries include:  Other (tonsils).  Current medications:  Muscle relaxants and pain medication.  Current occupation is Retired    Has horses, does mounted shooting.  Patient is currently not working due to present treatment problem.  Primary job tasks include:  Repetitive tasks, lifting and prolonged standing.    Barriers include:  None as reported by the patient.  Red flags:  None as reported by the patient.  Objective:    Rika OWENS Nikolas , : 1950, MRN: [id number]    Physical Therapy Objective Findings  Subjective information, goals, clinical impression, daily documentation and other information found in EPISODES tab.  Shoulder Objective Findings  Posture Comments: mild forward head, R shoulder rounded forward  Visual Assessment (atrophy, incision):  Good healing of incisions, no increased warmth, no signs of infection, good pulses  Screens:                                                                     Right                                                    Left                                                    Cervical (ROM, quadrant) Normal ROM Normal ROM   Thoracic Mobility              Range of Motion:                                    Right AROM            Right PROM            Left AROM              Left PROM                Flexion NA 45 wnl    Abduction NA 45 wnl    External Rotation NA 0 in 0 degrees wnl in 0 degrees  in 90 degrees   Internal Rotation NA 25  in 0 degrees wnl in 0 degrees  in 90 degrees   Elbow Flex/Ext 5-120 0-130 wnl      Scapulothoracic Rhythm:     Manual Muscle Testing (graded 0-5, measured at 0 degrees unless otherwise noted):                                                                        Right                                                    Left                                                       Flexion Fair isometric    Abduction Fair isometric    External Rotation good isometric    Internal Rotation Fair isometric    Extension     Mid Trap     Lower Trap     Other:     (+ mild pain, ++ moderate pain, +++ severe pain)    Comments:  Flexibility:                                                                         Right                                                   Left                                                       Pec Minor (supine) Mild tightness Mild tightness   Other     Other       Palpation:  Mild tenderness at incisions  Assessment/Plan:    Patient is a 67 year old female with right side shoulder complaints.    Patient has the following significant findings with corresponding treatment plan.                Diagnosis 1:  S/p R rotator cuff repair (large)  Pain -  hot/cold therapy, manual therapy, self management, education and home program  Decreased ROM/flexibility - manual therapy, therapeutic exercise and home program  Decreased strength - therapeutic exercise, therapeutic activities and home program  Decreased function - therapeutic activities and home program  Impaired posture - neuro re-education and home program    Therapy Evaluation Codes:   1) History comprised of:   Personal factors that impact the plan of care:      Age, Coping style and Time since onset of symptoms.    Comorbidity factors that impact the plan of care are:      Osteoarthritis.     Medications impacting care: Muscle relaxant and Pain.  2) Examination of Body Systems comprised of:   Body structures and functions that impact the plan of care:      Shoulder.   Activity limitations that impact the plan of care are:      Bathing, Cooking, Driving, Lifting, Throwing, Working, Sleeping and Laying down.  3) Clinical presentation characteristics are:   Stable/Uncomplicated.  4) Decision-Making    Low complexity using standardized patient assessment instrument and/or measureable assessment of  "functional outcome.  Cumulative Therapy Evaluation is: Low complexity.    Previous and current functional limitations:  (See Goal Flow Sheet for this information)    Short term and Long term goals: (See Goal Flow Sheet for this information)     Communication ability:  Patient appears to be able to clearly communicate and understand verbal and written communication and follow directions correctly.  Treatment Explanation - The following has been discussed with the patient:   RX ordered/plan of care  Anticipated outcomes  Possible risks and side effects  This patient would benefit from PT intervention to resume normal activities.   Rehab potential is good.    Frequency:  2 X week, once daily, 6 weeks, then 1x/week, 8 weeks, 1x/2 weeks for 8 weeks  Duration:  for 22 weeks  Discharge Plan:  Achieve all LTG.  Independent in home treatment program.  Reach maximal therapeutic benefit.      Caregiver Signature/Credentials _____________________________ Date ________       Treating Provider: Jax WALKERT, OCS   I have reviewed and certified the need for these services and plan of treatment while under my care.        PHYSICIAN'S SIGNATURE:   _________________________________________  Date___________   Mike Byers MD    Certification period:  Beginning of Cert date period: 03/13/18 to  End of Cert period date: 06/10/18     Functional Level Progress Report: Please see attached \"Goal Flow sheet for Functional level.\"    ____X____ Continue Services or       ________ DC Services                Service dates: From  SOC Date: 03/13/18 date to present                         "

## 2018-03-13 NOTE — PROGRESS NOTES
[facility] Initial Evaluation  Subjective:  Patient is a 67 year old female presenting with rehab right shoulder hpi. The history is provided by the patient. No  was used.   Rika Connor is a 67 year old female with a right shoulder condition.  Condition occurred with:  Repetition/overuse.  Condition occurred: at home.  This is a new condition  S/p R rotator cuff repair (large tear) with decompression, DCE, bicep tenodesis on 3/6/18.  Now taking extra strength Tylenol for pain control.  Sleeping in recliner.  Had stitches removed yesterday.  Hasn't started the codman's exercise yet.  Doing some AROM for R elbow. Icing with cryocuff 2-3x/day. .    Patient reports pain:  Upper arm, in the joint, anterior, posterior, lateral and medial.  Radiates to:  Upper arm.  Pain is described as sharp, stabbing and aching and is intermittent and reported as 9/10.  Associated symptoms:  Loss of motion/stiffness, loss of strength and edema. Pain is the same all the time.  Symptoms are exacerbated by using arm overhead, using arm at shoulder level, carrying, certain positions, using arm behind back, lifting and lying on extremity and relieved by ice.  Since onset symptoms are gradually improving.    Previous treatment includes physical therapy.  There was moderate improvement following previous treatment.  General health as reported by patient is good.  Pertinent medical history includes:  Osteoarthritis, overweight and menopausal.  Medical allergies: yes (penecillin).  Other surgeries include:  Other (tonsils).  Current medications:  Muscle relaxants and pain medication.  Current occupation is Retired    Has horses, does mounted shooting.  Patient is currently not working due to present treatment problem.  Primary job tasks include:  Repetitive tasks, lifting and prolonged standing.    Barriers include:  None as reported by the patient.    Red flags:  None as reported by the  patient.                        Objective:  System    Physical Exam    General     ROS  Rika Connor , : 1950, MRN: [id number]    Physical Therapy Objective Findings  Subjective information, goals, clinical impression, daily documentation and other information found in EPISODES tab.    Shoulder Objective Findings  Posture Comments: mild forward head, R shoulder rounded forward  Visual Assessment (atrophy, incision):  Good healing of incisions, no increased warmth, no signs of infection, good pulses  Screens:                                                                     Right                                                    Left                                                    Cervical (ROM, quadrant) Normal ROM Normal ROM   Thoracic Mobility              Range of Motion:                                    Right AROM            Right PROM            Left AROM              Left PROM                Flexion NA 45 wnl    Abduction NA 45 wnl    External Rotation NA 0 in 0 degrees wnl in 0 degrees  in 90 degrees   Internal Rotation NA 25  in 0 degrees wnl in 0 degrees  in 90 degrees   Elbow Flex/Ext 5-120 0-130 wnl      Scapulothoracic Rhythm:     Manual Muscle Testing (graded 0-5, measured at 0 degrees unless otherwise noted):                                                                        Right                                                    Left                                                      Flexion Fair isometric    Abduction Fair isometric    External Rotation good isometric    Internal Rotation Fair isometric    Extension     Mid Trap     Lower Trap     Other:     (+ mild pain, ++ moderate pain, +++ severe pain)    Comments:  Flexibility:                                                                         Right                                                   Left                                                       Pec Minor (supine) Mild tightness Mild tightness    Other     Other          Palpation:  Mild tenderness at incisions      Assessment/Plan:    Patient is a 67 year old female with right side shoulder complaints.    Patient has the following significant findings with corresponding treatment plan.                Diagnosis 1:  S/p R rotator cuff repair (large)    Pain -  hot/cold therapy, manual therapy, self management, education and home program  Decreased ROM/flexibility - manual therapy, therapeutic exercise and home program  Decreased strength - therapeutic exercise, therapeutic activities and home program  Decreased function - therapeutic activities and home program  Impaired posture - neuro re-education and home program    Therapy Evaluation Codes:   1) History comprised of:   Personal factors that impact the plan of care:      Age, Coping style and Time since onset of symptoms.    Comorbidity factors that impact the plan of care are:      Osteoarthritis.     Medications impacting care: Muscle relaxant and Pain.  2) Examination of Body Systems comprised of:   Body structures and functions that impact the plan of care:      Shoulder.   Activity limitations that impact the plan of care are:      Bathing, Cooking, Driving, Lifting, Throwing, Working, Sleeping and Laying down.  3) Clinical presentation characteristics are:   Stable/Uncomplicated.  4) Decision-Making    Low complexity using standardized patient assessment instrument and/or measureable assessment of functional outcome.  Cumulative Therapy Evaluation is: Low complexity.    Previous and current functional limitations:  (See Goal Flow Sheet for this information)    Short term and Long term goals: (See Goal Flow Sheet for this information)     Communication ability:  Patient appears to be able to clearly communicate and understand verbal and written communication and follow directions correctly.  Treatment Explanation - The following has been discussed with the patient:   RX ordered/plan of  care  Anticipated outcomes  Possible risks and side effects  This patient would benefit from PT intervention to resume normal activities.   Rehab potential is good.    Frequency:  2 X week, once daily, 6 weeks, then 1x/week, 8 weeks, 1x/2 weeks for 8 weeks  Duration:  for 22 weeks  Discharge Plan:  Achieve all LTG.  Independent in home treatment program.  Reach maximal therapeutic benefit.    Please refer to the daily flowsheet for treatment today, total treatment time and time spent performing 1:1 timed codes.     Jax Lundy,PT, DPT, OCS

## 2018-03-16 ENCOUNTER — THERAPY VISIT (OUTPATIENT)
Dept: PHYSICAL THERAPY | Facility: CLINIC | Age: 68
End: 2018-03-16
Payer: MEDICARE

## 2018-03-16 DIAGNOSIS — Z98.890 S/P ROTATOR CUFF REPAIR: ICD-10-CM

## 2018-03-16 DIAGNOSIS — M25.511 ACUTE PAIN OF RIGHT SHOULDER: ICD-10-CM

## 2018-03-16 PROCEDURE — 97110 THERAPEUTIC EXERCISES: CPT | Mod: GP | Performed by: PHYSICAL THERAPIST

## 2018-03-16 PROCEDURE — 97140 MANUAL THERAPY 1/> REGIONS: CPT | Mod: GP | Performed by: PHYSICAL THERAPIST

## 2018-03-19 ENCOUNTER — THERAPY VISIT (OUTPATIENT)
Dept: PHYSICAL THERAPY | Facility: CLINIC | Age: 68
End: 2018-03-19
Payer: MEDICARE

## 2018-03-19 DIAGNOSIS — M25.511 ACUTE PAIN OF RIGHT SHOULDER: ICD-10-CM

## 2018-03-19 DIAGNOSIS — Z98.890 S/P ROTATOR CUFF REPAIR: ICD-10-CM

## 2018-03-19 PROCEDURE — 97140 MANUAL THERAPY 1/> REGIONS: CPT | Mod: GP | Performed by: PHYSICAL THERAPIST

## 2018-03-19 PROCEDURE — 97110 THERAPEUTIC EXERCISES: CPT | Mod: GP | Performed by: PHYSICAL THERAPIST

## 2018-03-22 ENCOUNTER — THERAPY VISIT (OUTPATIENT)
Dept: PHYSICAL THERAPY | Facility: CLINIC | Age: 68
End: 2018-03-22
Payer: MEDICARE

## 2018-03-22 DIAGNOSIS — M25.511 ACUTE PAIN OF RIGHT SHOULDER: ICD-10-CM

## 2018-03-22 DIAGNOSIS — Z98.890 S/P ROTATOR CUFF REPAIR: ICD-10-CM

## 2018-03-22 PROCEDURE — 97110 THERAPEUTIC EXERCISES: CPT | Mod: GP | Performed by: PHYSICAL THERAPIST

## 2018-03-26 ENCOUNTER — THERAPY VISIT (OUTPATIENT)
Dept: PHYSICAL THERAPY | Facility: CLINIC | Age: 68
End: 2018-03-26
Payer: MEDICARE

## 2018-03-26 DIAGNOSIS — M25.511 ACUTE PAIN OF RIGHT SHOULDER: ICD-10-CM

## 2018-03-26 DIAGNOSIS — Z98.890 S/P ROTATOR CUFF REPAIR: ICD-10-CM

## 2018-03-26 PROCEDURE — 97140 MANUAL THERAPY 1/> REGIONS: CPT | Mod: GP | Performed by: PHYSICAL THERAPIST

## 2018-03-26 PROCEDURE — 97110 THERAPEUTIC EXERCISES: CPT | Mod: GP | Performed by: PHYSICAL THERAPIST

## 2018-03-29 ENCOUNTER — THERAPY VISIT (OUTPATIENT)
Dept: PHYSICAL THERAPY | Facility: CLINIC | Age: 68
End: 2018-03-29
Payer: MEDICARE

## 2018-03-29 DIAGNOSIS — M25.511 ACUTE PAIN OF RIGHT SHOULDER: ICD-10-CM

## 2018-03-29 DIAGNOSIS — Z98.890 S/P ROTATOR CUFF REPAIR: ICD-10-CM

## 2018-03-29 PROCEDURE — 97110 THERAPEUTIC EXERCISES: CPT | Mod: GP | Performed by: PHYSICAL THERAPIST

## 2018-03-29 PROCEDURE — 97140 MANUAL THERAPY 1/> REGIONS: CPT | Mod: GP | Performed by: PHYSICAL THERAPIST

## 2018-04-02 ENCOUNTER — THERAPY VISIT (OUTPATIENT)
Dept: PHYSICAL THERAPY | Facility: CLINIC | Age: 68
End: 2018-04-02
Payer: MEDICARE

## 2018-04-02 DIAGNOSIS — M25.511 ACUTE PAIN OF RIGHT SHOULDER: ICD-10-CM

## 2018-04-02 DIAGNOSIS — Z98.890 S/P ROTATOR CUFF REPAIR: ICD-10-CM

## 2018-04-02 PROCEDURE — 97140 MANUAL THERAPY 1/> REGIONS: CPT | Mod: GP | Performed by: PHYSICAL THERAPIST

## 2018-04-02 PROCEDURE — 97110 THERAPEUTIC EXERCISES: CPT | Mod: GP | Performed by: PHYSICAL THERAPIST

## 2018-04-05 ENCOUNTER — THERAPY VISIT (OUTPATIENT)
Dept: PHYSICAL THERAPY | Facility: CLINIC | Age: 68
End: 2018-04-05
Payer: MEDICARE

## 2018-04-05 DIAGNOSIS — M25.511 ACUTE PAIN OF RIGHT SHOULDER: ICD-10-CM

## 2018-04-05 DIAGNOSIS — Z98.890 S/P ROTATOR CUFF REPAIR: ICD-10-CM

## 2018-04-05 PROCEDURE — 97110 THERAPEUTIC EXERCISES: CPT | Mod: GP | Performed by: PHYSICAL THERAPIST

## 2018-04-05 PROCEDURE — 97140 MANUAL THERAPY 1/> REGIONS: CPT | Mod: GP | Performed by: PHYSICAL THERAPIST

## 2018-04-10 ENCOUNTER — THERAPY VISIT (OUTPATIENT)
Dept: PHYSICAL THERAPY | Facility: CLINIC | Age: 68
End: 2018-04-10
Payer: MEDICARE

## 2018-04-10 DIAGNOSIS — M25.511 ACUTE PAIN OF RIGHT SHOULDER: ICD-10-CM

## 2018-04-10 DIAGNOSIS — Z98.890 S/P ROTATOR CUFF REPAIR: ICD-10-CM

## 2018-04-10 PROCEDURE — G8986 CARRY D/C STATUS: HCPCS | Mod: GP | Performed by: PHYSICAL THERAPIST

## 2018-04-10 PROCEDURE — 97110 THERAPEUTIC EXERCISES: CPT | Mod: GP | Performed by: PHYSICAL THERAPIST

## 2018-04-10 PROCEDURE — 97140 MANUAL THERAPY 1/> REGIONS: CPT | Mod: GP | Performed by: PHYSICAL THERAPIST

## 2018-04-10 PROCEDURE — G8985 CARRY GOAL STATUS: HCPCS | Mod: GP | Performed by: PHYSICAL THERAPIST

## 2018-04-10 NOTE — LETTER
[facility] ATHLETIC [facility]  [address]  [facility] [address] [phone]    April 10, 2018    Re: Rika Connor   :   1950  MRN:  [id number]   REFERRING PHYSICIAN:   Mike Byers    [facility]  Date of Initial Evaluation:  3/13/18  Visits:  Rxs Used: 9  Reason for Referral:     S/P rotator cuff repair  Acute pain of right shoulder    PROGRESS  REPORT  Progress reporting period is from 3/13/18 to 4/10/18.       SUBJECTIVE  Subjective changes noted by patient:  doing well, sleeping whole night in bed on wedge, stretching progressively getting easier, no problems putting on shirts that button in the front    Current Pain level: 2/10.     Previous pain level was  NA Initial Pain level: 10/10.   Changes in function:  Yes (See Goal flowsheet attached for changes in current functional level)  Adverse reaction to treatment or activity: None    OBJECTIVE  Changes noted in objective findings:    Objective: PROM: flex 120, abd 80, ER(0) 35, elbow AROM wnl, mild hypomobile ant incisions     ASSESSMENT/PLAN  Updated problem list and treatment plan: Diagnosis 1:  S/p R rotator cuff repair (large tear)     Pain -  hot/cold therapy, manual therapy, self management, education and home program  Decreased ROM/flexibility - manual therapy, therapeutic exercise and home program  Decreased function - therapeutic activities and home program  STG/LTGs have been met or progress has been made towards goals:  Yes (See Goal flow sheet completed today.)  Assessment of Progress: The patient's condition is improving.  Self Management Plans:  Patient has been instructed in a home treatment program.  I have re-evaluated this patient and find that the nature, scope, duration and intensity of the therapy is appropriate for the medical condition of the patient.  Rika continues to require the following intervention to meet STG  and LTG's:  PT      Recommendations:  This patient would benefit from continued therapy.     Frequency:  2 X week, once daily, 3 weeks, then 1x/week, 6 weeks  Duration:  for 9 weeks        Thank you for your referral.    INQUIRIES  Therapist: Jax Lundy,PT, DPT, [facility]    [facility] [place] [facility]  [address] [place] [address]. [address]  [facility] [address]  Phone: [phone]  Fax: [phone]

## 2018-04-10 NOTE — MR AVS SNAPSHOT
After Visit Summary   4/10/2018    Rika Connor    MRN: [id number]           Patient Information     Date Of Birth          1950        Visit Information        Provider Department      4/10/2018 12:00 PM Jax Lundy, PT [facility]        Today's Diagnoses     S/P rotator cuff repair        Acute pain of right shoulder           Follow-ups after your visit        Your next 10 appointments already scheduled     Apr 13, 2018  2:50 PM CDT   JUDI Extremity with Jax Lundy, PT   [facility] ([facility]  )    [address]  [facility] [address]   [phone]            Apr 17, 2018 11:20 AM CDT   JUDI Extremity with Jax Lundy, PT   [facility] ([facility]  )    [address]  [facility] [address]   [phone]            Apr 20, 2018  2:50 PM CDT   JUDI Extremity with Jax Lundy, PT   [facility] ([facility]  )    [address]  [facility] [address]   [phone]            Apr 23, 2018  1:30 PM CDT   JUDI Extremity with Jax Lundy, PT   [facility] ([facility]  )    [address]  [facility] [address]   [phone]            Apr 26, 2018 12:10 PM CDT   JUDI Extremity with Rocky Chávez, PT   [facility] ([facility]  )    [address]  [place] [address]   [phone]            Apr 30, 2018  4:10 PM CDT   JUDI Extremity with Jax Lundy, PT   [facility] ([facility]  )    [address]  [facility] [address]   [phone]            May 03, 2018 11:20 AM CDT   JDUI Extremity with Jax Lundy, PT   [facility] [place] [facility] ([facility]  )    [address]  [facility] [address] [phone]              Who to contact     If you have questions or need follow up information about today's clinic visit or your schedule please contact [facility] directly at [phone].  Normal or non-critical lab and imaging results will be communicated to you by MakuCellhart, letter or phone within 4 business days after the clinic has received the results. If you do not hear from us within 7 days, please contact the clinic through MakuCellhart or phone. If you have a critical or abnormal lab result, we will notify you by phone as soon as possible.  Submit refill requests through [employer] or call your pharmacy and they will forward the refill request to us. Please allow 3 business days for your refill to be completed.          Additional Information About Your Visit        M[employer]Cell[employer]ousing Information     [employer]rid lets you send messages to your doctor, view your test results, renew your prescriptions, schedule appointments and more. To sign up, go to [email or web address] . Click on \"Log in\" on the left side of the screen, which will take you to the Welcome page. Then click on \"Sign up Now\" on the right side of the page.     You will be asked to enter the access code listed below, as well as some personal information. Please follow the directions to create your username and password.     Your access code is: [email or web address]  Expires: 4/15/2018 12:50 PM     Your access code will  in 90 days. If you need help or a new code, please call your [place] clinic or [phone].        Care EveryWhere ID     This is your Care EveryWhere ID. This could be used by other organizations to access your [place] medical records  [id number]         Blood Pressure from Last 3 Encounters:   No data found for BP    Weight from Last 3 Encounters:   No data found for Wt    "           We Performed the Following     JUDI PROGRESS NOTES REPORT     MANUAL THER TECH,1+REGIONS,EA 15 MIN     THERAPEUTIC EXERCISES        Primary Care Provider Fax #    [employer] [facility] [phone] [address] [facility] [address]        Equal Access to Services     LONDON LEA : Hadii aad ku hadchristiano Soomaali, wachuyitada luqadaha, qaybta kaalmada adeandres, negro laquita myrandasoraya chappell migeltrevor izquierdo. So [facility] [phone].    ATENCIÓN: Si habla español, tiene a verde disposición servicios gratuitos de asistencia lingüística. Llame al [phone].    We comply with applicable federal civil rights laws and [place] laws. We do not discriminate on the basis of race, color, national origin, age, disability, sex, sexual orientation, or gender identity.            Thank you!     Thank you for choosing [facility]  for your care. Our goal is always to provide you with excellent care. Hearing back from our patients is one way we can continue to improve our services. Please take a few minutes to complete the written survey that you may receive in the mail after your visit with us. Thank you!             Your Updated Medication List - Protect others around you: Learn how to safely use, store and throw away your medicines at www.disposemymeds.org.      Notice  As of 4/10/2018 12:53 PM    You have not been prescribed any medications.

## 2018-04-10 NOTE — PROGRESS NOTES
Subjective:  HPI                    Objective:  System    Physical Exam    General     ROS    Assessment/Plan:    PROGRESS  REPORT    Progress reporting period is from 3/13/18 to 4/10/18.       SUBJECTIVE  Subjective changes noted by patient:  doing well, sleeping whole night in bed on wedge, stretching progressively getting easier, no problems putting on shirts that button in the front    Current Pain level: 2/10.     Previous pain level was  NA Initial Pain level: 10/10.   Changes in function:  Yes (See Goal flowsheet attached for changes in current functional level)  Adverse reaction to treatment or activity: None    OBJECTIVE  Changes noted in objective findings:    Objective: PROM: flex 120, abd 80, ER(0) 35, elbow AROM wnl, mild hypomobile ant incisions     ASSESSMENT/PLAN  Updated problem list and treatment plan: Diagnosis 1:  S/p R rotator cuff repair (large tear)    Pain -  hot/cold therapy, manual therapy, self management, education and home program  Decreased ROM/flexibility - manual therapy, therapeutic exercise and home program  Decreased function - therapeutic activities and home program  STG/LTGs have been met or progress has been made towards goals:  Yes (See Goal flow sheet completed today.)  Assessment of Progress: The patient's condition is improving.  Self Management Plans:  Patient has been instructed in a home treatment program.  I have re-evaluated this patient and find that the nature, scope, duration and intensity of the therapy is appropriate for the medical condition of the patient.  Rika continues to require the following intervention to meet STG and LTG's:  PT    Recommendations:  This patient would benefit from continued therapy.     Frequency:  2 X week, once daily, 3 weeks, then 1x/week, 6 weeks  Duration:  for 9 weeks        Please refer to the daily flowsheet for treatment today, total treatment time and time spent performing 1:1 timed codes.      Jax Lundy,PT, DPT,  OCS

## 2018-04-13 ENCOUNTER — THERAPY VISIT (OUTPATIENT)
Dept: PHYSICAL THERAPY | Facility: CLINIC | Age: 68
End: 2018-04-13
Payer: MEDICARE

## 2018-04-13 DIAGNOSIS — M25.511 ACUTE PAIN OF RIGHT SHOULDER: ICD-10-CM

## 2018-04-13 DIAGNOSIS — Z98.890 S/P ROTATOR CUFF REPAIR: ICD-10-CM

## 2018-04-13 PROCEDURE — 97110 THERAPEUTIC EXERCISES: CPT | Mod: GP | Performed by: PHYSICAL THERAPIST

## 2018-04-17 ENCOUNTER — THERAPY VISIT (OUTPATIENT)
Dept: PHYSICAL THERAPY | Facility: CLINIC | Age: 68
End: 2018-04-17
Payer: MEDICARE

## 2018-04-17 DIAGNOSIS — Z98.890 S/P ROTATOR CUFF REPAIR: ICD-10-CM

## 2018-04-17 DIAGNOSIS — M25.511 ACUTE PAIN OF RIGHT SHOULDER: ICD-10-CM

## 2018-04-17 PROCEDURE — 97140 MANUAL THERAPY 1/> REGIONS: CPT | Mod: GP | Performed by: PHYSICAL THERAPIST

## 2018-04-17 PROCEDURE — 97110 THERAPEUTIC EXERCISES: CPT | Mod: GP | Performed by: PHYSICAL THERAPIST

## 2018-04-20 ENCOUNTER — THERAPY VISIT (OUTPATIENT)
Dept: PHYSICAL THERAPY | Facility: CLINIC | Age: 68
End: 2018-04-20
Payer: MEDICARE

## 2018-04-20 DIAGNOSIS — Z98.890 S/P ROTATOR CUFF REPAIR: ICD-10-CM

## 2018-04-20 DIAGNOSIS — M25.511 ACUTE PAIN OF RIGHT SHOULDER: ICD-10-CM

## 2018-04-20 PROCEDURE — 97140 MANUAL THERAPY 1/> REGIONS: CPT | Mod: GP | Performed by: PHYSICAL THERAPIST

## 2018-04-20 PROCEDURE — 97110 THERAPEUTIC EXERCISES: CPT | Mod: GP | Performed by: PHYSICAL THERAPIST

## 2018-04-23 ENCOUNTER — THERAPY VISIT (OUTPATIENT)
Dept: PHYSICAL THERAPY | Facility: CLINIC | Age: 68
End: 2018-04-23
Payer: MEDICARE

## 2018-04-23 DIAGNOSIS — Z98.890 S/P ROTATOR CUFF REPAIR: ICD-10-CM

## 2018-04-23 DIAGNOSIS — M25.511 ACUTE PAIN OF RIGHT SHOULDER: ICD-10-CM

## 2018-04-23 PROCEDURE — 97110 THERAPEUTIC EXERCISES: CPT | Mod: GP | Performed by: PHYSICAL THERAPIST

## 2018-04-23 PROCEDURE — 97140 MANUAL THERAPY 1/> REGIONS: CPT | Mod: GP | Performed by: PHYSICAL THERAPIST

## 2018-04-30 ENCOUNTER — THERAPY VISIT (OUTPATIENT)
Dept: PHYSICAL THERAPY | Facility: CLINIC | Age: 68
End: 2018-04-30
Payer: MEDICARE

## 2018-04-30 DIAGNOSIS — M25.511 ACUTE PAIN OF RIGHT SHOULDER: ICD-10-CM

## 2018-04-30 DIAGNOSIS — Z98.890 S/P ROTATOR CUFF REPAIR: ICD-10-CM

## 2018-04-30 PROCEDURE — 97110 THERAPEUTIC EXERCISES: CPT | Mod: GP | Performed by: PHYSICAL THERAPIST

## 2018-04-30 PROCEDURE — 97112 NEUROMUSCULAR REEDUCATION: CPT | Mod: GP | Performed by: PHYSICAL THERAPIST

## 2018-05-08 ENCOUNTER — THERAPY VISIT (OUTPATIENT)
Dept: PHYSICAL THERAPY | Facility: CLINIC | Age: 68
End: 2018-05-08
Payer: MEDICARE

## 2018-05-08 DIAGNOSIS — Z98.890 S/P ROTATOR CUFF REPAIR: ICD-10-CM

## 2018-05-08 DIAGNOSIS — M25.511 ACUTE PAIN OF RIGHT SHOULDER: ICD-10-CM

## 2018-05-08 PROCEDURE — 97530 THERAPEUTIC ACTIVITIES: CPT | Mod: GP | Performed by: PHYSICAL THERAPIST

## 2018-05-08 PROCEDURE — 97110 THERAPEUTIC EXERCISES: CPT | Mod: GP | Performed by: PHYSICAL THERAPIST

## 2018-05-14 ENCOUNTER — THERAPY VISIT (OUTPATIENT)
Dept: PHYSICAL THERAPY | Facility: CLINIC | Age: 68
End: 2018-05-14
Payer: MEDICARE

## 2018-05-14 DIAGNOSIS — M25.511 ACUTE PAIN OF RIGHT SHOULDER: ICD-10-CM

## 2018-05-14 DIAGNOSIS — Z98.890 S/P ROTATOR CUFF REPAIR: ICD-10-CM

## 2018-05-14 PROCEDURE — 97110 THERAPEUTIC EXERCISES: CPT | Mod: GP | Performed by: PHYSICAL THERAPIST

## 2018-05-14 PROCEDURE — 97112 NEUROMUSCULAR REEDUCATION: CPT | Mod: GP | Performed by: PHYSICAL THERAPIST

## 2018-05-22 ENCOUNTER — THERAPY VISIT (OUTPATIENT)
Dept: PHYSICAL THERAPY | Facility: CLINIC | Age: 68
End: 2018-05-22
Payer: MEDICARE

## 2018-05-22 DIAGNOSIS — M25.511 ACUTE PAIN OF RIGHT SHOULDER: ICD-10-CM

## 2018-05-22 DIAGNOSIS — Z98.890 S/P ROTATOR CUFF REPAIR: ICD-10-CM

## 2018-05-22 PROCEDURE — 97112 NEUROMUSCULAR REEDUCATION: CPT | Mod: GP | Performed by: PHYSICAL THERAPIST

## 2018-05-22 PROCEDURE — 97110 THERAPEUTIC EXERCISES: CPT | Mod: GP | Performed by: PHYSICAL THERAPIST

## 2018-05-29 ENCOUNTER — THERAPY VISIT (OUTPATIENT)
Dept: PHYSICAL THERAPY | Facility: CLINIC | Age: 68
End: 2018-05-29
Payer: MEDICARE

## 2018-05-29 DIAGNOSIS — Z98.890 S/P ROTATOR CUFF REPAIR: ICD-10-CM

## 2018-05-29 DIAGNOSIS — M25.511 ACUTE PAIN OF RIGHT SHOULDER: ICD-10-CM

## 2018-05-29 PROCEDURE — G8984 CARRY CURRENT STATUS: HCPCS | Mod: GP | Performed by: PHYSICAL THERAPIST

## 2018-05-29 PROCEDURE — 97110 THERAPEUTIC EXERCISES: CPT | Mod: GP | Performed by: PHYSICAL THERAPIST

## 2018-05-29 PROCEDURE — 97112 NEUROMUSCULAR REEDUCATION: CPT | Mod: KX | Performed by: PHYSICAL THERAPIST

## 2018-05-29 PROCEDURE — G8985 CARRY GOAL STATUS: HCPCS | Mod: GP | Performed by: PHYSICAL THERAPIST

## 2018-05-29 NOTE — PROGRESS NOTES
Subjective:  HPI                    Objective:  System    Physical Exam    General     ROS    Assessment/Plan:    PROGRESS  REPORT    Progress reporting period is from 4/10/18 to 5/29/18.       SUBJECTIVE  Subjective changes noted by patient:  typically doing well, she did have a couple of days when she got sore, she thinks she might have over done things a little bit, dressing upper body with ease, able to lay on her R side for a short time    Current Pain level: 0/10 (worst 3/10).     Previous pain level was  NA Initial Pain level: 10/10.   Changes in function:  Yes (See Goal flowsheet attached for changes in current functional level)  Adverse reaction to treatment or activity: activity - using her R arm too much    OBJECTIVE  Changes noted in objective findings:    Objective: PROM: flex 165, abd 135, ER(90) 60, IR(90) 45, AROM: flex 110, abd 99, ER(0) 40, IR(0) L5, MMT: functionally 3-/5 all motions     ASSESSMENT/PLAN  Updated problem list and treatment plan: Diagnosis 1:  S/p R rotator cuff repair (large)    Pain -  hot/cold therapy, manual therapy, self management, education and home program  Decreased ROM/flexibility - manual therapy, therapeutic exercise and home program  Decreased strength - therapeutic exercise, therapeutic activities and home program  Decreased function - therapeutic activities and home program  STG/LTGs have been met or progress has been made towards goals:  Yes (See Goal flow sheet completed today.)  Assessment of Progress: The patient's condition is improving.  Self Management Plans:  Patient has been instructed in a home treatment program.  I have re-evaluated this patient and find that the nature, scope, duration and intensity of the therapy is appropriate for the medical condition of the patient.  Rika continues to require the following intervention to meet STG and LTG's:  PT    Recommendations:  This patient would benefit from continued therapy.     Frequency:  1 X week, once  daily  Duration:  for 8 weeks        Please refer to the daily flowsheet for treatment today, total treatment time and time spent performing 1:1 timed codes.    Jax Lundy,PT, DPT, OCS

## 2018-05-29 NOTE — LETTER
[facility]  [address]  [facility] [address] [phone]    May 29, 2018    Re: Rika Connor   :   1950  MRN:  [id number]   REFERRING PHYSICIAN:   Mike Byers    [facility]  Date of Initial Evaluation:   Visits:  Rxs Used: 18  Reason for Referral:     S/P rotator cuff repair  Acute pain of right shoulder    PROGRESS  REPORT  Progress reporting period is from 4/10/18 to 18.       SUBJECTIVE  Subjective changes noted by patient:  typically doing well, she did have a couple of days when she got sore, she thinks she might have over done things a little bit, dressing upper body with ease, able to lay on her R side for a short time    Current Pain level: 0/10 (worst 3/10).     Previous pain level was  NA Initial Pain level: 10/10.   Changes in function:  Yes (See Goal flowsheet attached for changes in current functional level)  Adverse reaction to treatment or activity: activity - using her R arm too much    OBJECTIVE  Changes noted in objective findings:    Objective: PROM: flex 165, abd 135, ER(90) 60, IR(90) 45, AROM: flex 110, abd 99, ER(0) 40, IR(0) L5, MMT: functionally 3-/5 all motions     ASSESSMENT/PLAN  Updated problem list and treatment plan: Diagnosis 1:  S/p R rotator cuff repair (large)  Pain -  hot/cold therapy, manual therapy, self management, education and home program  Decreased ROM/flexibility - manual therapy, therapeutic exercise and home program  Decreased strength - therapeutic exercise, therapeutic activities and home program  Decreased function - therapeutic activities and home program  STG/LTGs have been met or progress has been made towards goals:  Yes (See Goal flow sheet completed today.)  Assessment of Progress: The patient's condition is improving.  Self Management Plans:  Patient has been instructed in a home treatment program.  I have  re-evaluated this patient and find that the nature, scope, duration and intensity of the therapy is appropriate for the medical condition of the patient.  Rika continues to require the following intervention to meet STG and LTG's:  PT        Recommendations:  This patient would benefit from continued therapy.     Frequency:  1 X week, once daily  Duration:  for 8 weeks      Thank you for your referral.    INQUIRIES  Therapist: Jax Lundy,PT, DPT, [facility]    [facility]  [address] [place], [place] [address]  [facility] [address]  Phone: [phone]  Fax: [phone]

## 2018-05-29 NOTE — LETTER
DEPARTMENT OF HEALTH AND HUMAN SERVICES  CENTERS FOR MEDICARE & MEDICAID SERVICES    PLAN/UPDATED PLAN OF PROGRESS FOR OUTPATIENT REHABILITATION    PATIENTS NAME:  Rika Connor     : 1950    PROVIDER NUMBER:    [id number]    [id number]     PROVIDER NAME: [place] [facility]    MEDICAL RECORD NUMBER: [id number]     START OF CARE DATE:  SOC Date: 18   TYPE:  PT    PRIMARY/TREATMENT DIAGNOSIS: (Pertinent Medical Diagnosis)     S/P rotator cuff repair  Acute pain of right shoulder    VISITS FROM START OF CARE:  Rxs Used: 18     PROGRESS  REPORT  Progress reporting period is from 4/10/18 to 18.       SUBJECTIVE  Subjective changes noted by patient:  typically doing well, she did have a couple of days when she got sore, she thinks she might have over done things a little bit, dressing upper body with ease, able to lay on her R side for a short time    Current Pain level: 0/10 (worst 3/10).     Previous pain level was  NA Initial Pain level: 10/10.   Changes in function:  Yes (See Goal flowsheet attached for changes in current functional level)  Adverse reaction to treatment or activity: activity - using her R arm too much    OBJECTIVE  Changes noted in objective findings:    Objective: PROM: flex 165, abd 135, ER(90) 60, IR(90) 45, AROM: flex 110, abd 99, ER(0) 40, IR(0) L5, MMT: functionally 3-/5 all motions     ASSESSMENT/PLAN  Updated problem list and treatment plan: Diagnosis 1:  S/p R rotator cuff repair (large)  Pain -  hot/cold therapy, manual therapy, self management, education and home program  Decreased ROM/flexibility - manual therapy, therapeutic exercise and home program  Decreased strength - therapeutic exercise, therapeutic activities and home program  Decreased function - therapeutic activities and home program  STG/LTGs have been met or progress has been made towards goals:  Yes (See Goal flow sheet completed today.)  Assessment of  "Progress: The patient's condition is improving.  Self Management Plans:  Patient has been instructed in a home treatment program.  I have re-evaluated this patient and find that the nature, scope, duration and intensity of the therapy is appropriate for the medical condition of the patient.  Rika continues to require the following intervention to meet STG and LTG's:  PT    Recommendations:  This patient would benefit from continued therapy.     Frequency:  1 X week, once daily  Duration:  for 8 weeks    Caregiver Signature/Credentials _____________________________ Date ________       Treating Provider: Jax Lundy DPT, OCS   I have reviewed and certified the need for these services and plan of treatment while under my care.        PHYSICIAN'S SIGNATURE:   _________________________________________  Date___________   Mike Byers MD    Certification period:  Beginning of Cert date period: 05/29/18 to  End of Cert period date: 08/25/18     Functional Level Progress Report: Please see attached \"Goal Flow sheet for Functional level.\"    ____X____ Continue Services or       ________ DC Services                Service dates: From  SOC Date: 03/13/18 date to present                         "

## 2018-05-29 NOTE — MR AVS SNAPSHOT
After Visit Summary   5/29/2018    Rika Connor    MRN: [id number]           Patient Information     Date Of Birth          1950        Visit Information        Provider Department      5/29/2018 1:50 PM Jax Lundy, PT [facility] Physical Therapy        Today's Diagnoses     S/P rotator cuff repair        Acute pain of right shoulder           Follow-ups after your visit        Your next 10 appointments already scheduled     Jun 05, 2018  2:30 PM CDT   JUDI Extremity with Jax Lundy PT   [facility] Physical Therapy ([facility]  )    [address]  [place] [address]   [phone]            Jun 12, 2018 11:20 AM CDT   JUDI Extremity with aJx Lundy PT   [facility] Physical Therapy ([facility]  )    [address]  [place] [address]   [phone]            Jun 19, 2018 11:20 AM CDT   JUDI Extremity with Jax Lundy PT   [facility] Physical Therapy ([facility]  )    [address]  [place] [address]   [phone]            Jun 25, 2018 11:00 AM CDT   JUDI Extremity with Jax Lundy PT   [facility] Physical Therapy ([facility]  )    [address]  [place] [address]   [phone]              Who to contact     If you have questions or need follow up information about today's clinic visit or your schedule please contact [place] [facility] directly at [phone].  Normal or non-critical lab and imaging results will be communicated to you by MyChart, letter or phone within 4 business days after the clinic has received the results. If you do not hear from us within 7 days, please contact the clinic through MyChart or phone. If you have a critical or abnormal lab result, we will  "notify you by phone as soon as possible.  Submit refill requests through [employer] or call your pharmacy and they will forward the refill request to us. Please allow 3 business days for your refill to be completed.          Additional Information About Your Visit        [employer] Information     [employer] lets you send messages to your doctor, view your test results, renew your prescriptions, schedule appointments and more. To sign up, go to [email or web address] . Click on \"Log in\" on the left side of the screen, which will take you to the Welcome page. Then click on \"Sign up Now\" on the right side of the page.     You will be asked to enter the access code listed below, as well as some personal information. Please follow the directions to create your username and password.     Your access code is: [email or web address]  Expires: 2018 12:04 PM     Your access code will  in 90 days. If you need help or a new code, please call your [place] clinic or [phone].        Care EveryWhere ID     This is your Care EveryWhere ID. This could be used by other organizations to access your [place] medical records  [id number]         Blood Pressure from Last 3 Encounters:   No data found for BP    Weight from Last 3 Encounters:   No data found for Wt              We Performed the Following     JUDI PROGRESS NOTES REPORT     NEUROMUSCULAR RE-EDUCATION     THERAPEUTIC EXERCISES        Primary Care Provider Fax #    [facility] [phone]       [address] [place] [id number]        Equal Access to Services     LONDON LEA : Hadii clair banueloso Somarly, waaxda luqadaha, qaybta kaalmada adeegyada, negro izquierdo. So [facility] [phone].    ATENCIÓN: Si habla español, tiene a verde disposición servicios gratuitos de asistencia lingüística. Wrad al [phone].    We comply with applicable federal civil rights laws and [place] laws. We do not discriminate on the basis of " race, color, national origin, age, disability, sex, sexual orientation, or gender identity.            Thank you!     Thank you for choosing [facility]  for your care. Our goal is always to provide you with excellent care. Hearing back from our patients is one way we can continue to improve our services. Please take a few minutes to complete the written survey that you may receive in the mail after your visit with us. Thank you!             Your Updated Medication List - Protect others around you: Learn how to safely use, store and throw away your medicines at www.disposemymeds.org.      Notice  As of 5/29/2018  2:30 PM    You have not been prescribed any medications.

## 2018-06-01 NOTE — PROGRESS NOTES
Subjective:  HPI                    Objective:  System    Physical Exam    General     ROS    Assessment/Plan:    DISCHARGE REPORT    Progress reporting period is from 1/5/18 to 2/28/18.       SUBJECTIVE  Subjective changes noted by patient:  Pt presents today for pre op taching prior to rotator cuff repair on the R shoulder. Pt with complete tear of the suradspinatus and partial tear infraspinatus. Pain at rest at 0/10 level, difficulty functioally working in the paddock, reeaching out to side, behind back and occasionally overhead.     Current Pain level: 0/10 (Pain with activity up to 3/10).     Previous pain level was  2-6/10 Initial Pain level:  (2-5/10).   Changes in function:  Yes (See Goal flowsheet attached for changes in current functional level)  Adverse reaction to treatment or activity: activity - doing too much activity with R arm    OBJECTIVE  Changes noted in objective findings:    MMT: shoulder abd 4+/5 (+), ER (4+/5, IR 4+/5, mild + neers, mild + heart, - corocoid, + cross over      ASSESSMENT/PLAN  Updated problem list and treatment plan: Diagnosis 1:  R shoulder pain, found to have rotator cuff tear large    Pain -  home program  Decreased function - home program  STG/LTGs have been met or progress has been made towards goals:  Yes (See Goal flow sheet completed today.)  Assessment of Progress: The patient's progress has plateaued.  Self Management Plans:  Patient has been instructed in a home treatment program.  I have re-evaluated this patient and find that the nature, scope, duration and intensity of the therapy is appropriate for the medical condition of the patient.  Rika continues to require the following intervention to meet STG and LTG's:  PT intervention is no longer required to meet STG/LTG.    Recommendations:  This patient is ready to be discharged from therapy and continue their home treatment program.  Pt to have surgery for rotator cuff repair.    Please refer to the daily  flowsheet for treatment today, total treatment time and time spent performing 1:1 timed codes.    Jax Lundy,PT, DPT, OCS

## 2018-06-05 ENCOUNTER — THERAPY VISIT (OUTPATIENT)
Dept: PHYSICAL THERAPY | Facility: CLINIC | Age: 68
End: 2018-06-05
Payer: MEDICARE

## 2018-06-05 DIAGNOSIS — M25.511 ACUTE PAIN OF RIGHT SHOULDER: ICD-10-CM

## 2018-06-05 DIAGNOSIS — Z98.890 S/P ROTATOR CUFF REPAIR: ICD-10-CM

## 2018-06-05 PROCEDURE — 97112 NEUROMUSCULAR REEDUCATION: CPT | Mod: KX | Performed by: PHYSICAL THERAPIST

## 2018-06-05 PROCEDURE — 97110 THERAPEUTIC EXERCISES: CPT | Mod: KX | Performed by: PHYSICAL THERAPIST

## 2018-06-12 ENCOUNTER — THERAPY VISIT (OUTPATIENT)
Dept: PHYSICAL THERAPY | Facility: CLINIC | Age: 68
End: 2018-06-12
Payer: MEDICARE

## 2018-06-12 DIAGNOSIS — M25.511 ACUTE PAIN OF RIGHT SHOULDER: ICD-10-CM

## 2018-06-12 DIAGNOSIS — Z98.890 S/P ROTATOR CUFF REPAIR: ICD-10-CM

## 2018-06-12 PROCEDURE — 97530 THERAPEUTIC ACTIVITIES: CPT | Mod: GP | Performed by: PHYSICAL THERAPIST

## 2018-06-12 PROCEDURE — 97140 MANUAL THERAPY 1/> REGIONS: CPT | Mod: GP | Performed by: PHYSICAL THERAPIST

## 2018-06-12 PROCEDURE — 97112 NEUROMUSCULAR REEDUCATION: CPT | Mod: GP | Performed by: PHYSICAL THERAPIST

## 2018-06-26 ENCOUNTER — THERAPY VISIT (OUTPATIENT)
Dept: PHYSICAL THERAPY | Facility: CLINIC | Age: 68
End: 2018-06-26
Payer: MEDICARE

## 2018-06-26 DIAGNOSIS — M25.511 ACUTE PAIN OF RIGHT SHOULDER: ICD-10-CM

## 2018-06-26 DIAGNOSIS — Z98.890 S/P ROTATOR CUFF REPAIR: ICD-10-CM

## 2018-06-26 PROCEDURE — 97110 THERAPEUTIC EXERCISES: CPT | Mod: KX | Performed by: PHYSICAL THERAPIST

## 2018-06-26 PROCEDURE — 97140 MANUAL THERAPY 1/> REGIONS: CPT | Mod: KX | Performed by: PHYSICAL THERAPIST

## 2018-06-26 PROCEDURE — 97112 NEUROMUSCULAR REEDUCATION: CPT | Mod: KX | Performed by: PHYSICAL THERAPIST

## 2018-07-16 ENCOUNTER — THERAPY VISIT (OUTPATIENT)
Dept: PHYSICAL THERAPY | Facility: CLINIC | Age: 68
End: 2018-07-16
Payer: MEDICARE

## 2018-07-16 DIAGNOSIS — Z98.890 S/P ROTATOR CUFF REPAIR: ICD-10-CM

## 2018-07-16 DIAGNOSIS — M25.511 ACUTE PAIN OF RIGHT SHOULDER: ICD-10-CM

## 2018-07-16 PROCEDURE — 97110 THERAPEUTIC EXERCISES: CPT | Mod: GP | Performed by: PHYSICAL THERAPIST

## 2018-07-16 PROCEDURE — 97112 NEUROMUSCULAR REEDUCATION: CPT | Mod: GP | Performed by: PHYSICAL THERAPIST

## 2018-07-16 PROCEDURE — 97140 MANUAL THERAPY 1/> REGIONS: CPT | Mod: GP | Performed by: PHYSICAL THERAPIST

## 2018-07-30 ENCOUNTER — THERAPY VISIT (OUTPATIENT)
Dept: PHYSICAL THERAPY | Facility: CLINIC | Age: 68
End: 2018-07-30
Payer: MEDICARE

## 2018-07-30 DIAGNOSIS — M25.511 ACUTE PAIN OF RIGHT SHOULDER: ICD-10-CM

## 2018-07-30 DIAGNOSIS — Z98.890 S/P ROTATOR CUFF REPAIR: ICD-10-CM

## 2018-07-30 PROCEDURE — G8985 CARRY GOAL STATUS: HCPCS | Mod: GP | Performed by: PHYSICAL THERAPIST

## 2018-07-30 PROCEDURE — 97110 THERAPEUTIC EXERCISES: CPT | Mod: GP | Performed by: PHYSICAL THERAPIST

## 2018-07-30 PROCEDURE — G8986 CARRY D/C STATUS: HCPCS | Mod: GP | Performed by: PHYSICAL THERAPIST

## 2018-07-30 PROCEDURE — 97112 NEUROMUSCULAR REEDUCATION: CPT | Mod: KX | Performed by: PHYSICAL THERAPIST

## 2018-07-30 NOTE — PROGRESS NOTES
Subjective:  HPI                    Objective:  System    Physical Exam    General     ROS    Assessment/Plan:    DISCHARGE REPORT    Progress reporting period is from 5/29/18 to 7/30/18.       SUBJECTIVE  Subjective changes noted by patient:  doing well, no problems with laying on R side now, able to carry gal of milk with R hand, no problems with carrying grocery bags in R hand, was able to ride horse 2x withotu pain    Current Pain level: 0/10 (worst 1-2/10).     Previous pain level was  0-3/10 Initial Pain level: 10/10.   Changes in function:  Yes (See Goal flowsheet attached for changes in current functional level)  Adverse reaction to treatment or activity: None    OBJECTIVE  Changes noted in objective findings:    Objective: R shoulder PROM: flex 170, abd 170, ER(90) 80, IR(90) 50, R shoulder AROM: flex: 157, abd: 155, ER: 63, IR: T8, MMT: abd 4+/5, ER 4+/5, IR 4+/5, biceps 4+/5, triceps 4+/5, middle trap 4+/5, lower trap 4/5     ASSESSMENT/PLAN  Updated problem list and treatment plan: Diagnosis 1:  S/p rotator cuff  Repair    Decreased ROM/flexibility - home program  Decreased strength - home program  STG/LTGs have been met or progress has been made towards goals:  Yes (See Goal flow sheet completed today.)  Assessment of Progress: The patient has met all of their long term goals.  Self Management Plans:  Patient has been instructed in a home treatment program.  I have re-evaluated this patient and find that the nature, scope, duration and intensity of the therapy is appropriate for the medical condition of the patient.  Rika continues to require the following intervention to meet STG and LTG's:  PT intervention is no longer required to meet STG/LTG.    Recommendations:  This patient is ready to be discharged from therapy and continue their home treatment program.    Please refer to the daily flowsheet for treatment today, total treatment time and time spent performing 1:1 timed codes.      Jax  Olmscheid,PT, DPT, OCS

## 2018-07-30 NOTE — LETTER
[facility]  [address]  [facility] [address] [phone]    2018    Re: Rika Connor   :   1950  MRN:  [id number]   REFERRING PHYSICIAN:   Mike Byers    [facility]  Date of Initial Evaluation:  18  Visits:  Rxs Used: 28  Reason for Referral:     S/P rotator cuff repair  Acute pain of right shoulder    DISCHARGE REPORT  Progress reporting period is from 3/13/18 to 18.       SUBJECTIVE  Subjective changes noted by patient:  doing well, no problems with laying on R side now, able to carry gal of milk with R hand, no problems with carrying grocery bags in R hand, was able to ride horse 2x withotu pain    Current Pain level: 0/10 (worst 1-2/10).     Previous pain level was  0-3/10 Initial Pain level: 10/10.   Changes in function:  Yes (See Goal flowsheet attached for changes in current functional level)  Adverse reaction to treatment or activity: None    OBJECTIVE  Changes noted in objective findings:    Objective: R shoulder PROM: flex 170, abd 170, ER(90) 80, IR(90) 50, R shoulder AROM: flex: 157, abd: 155, ER: 63, IR: T8, MMT: abd 4+/5, ER 4+/5, IR 4+/5, biceps 4+/5, triceps 4+/5, middle trap 4+/5, lower trap 4/5     ASSESSMENT/PLAN  Updated problem list and treatment plan: Diagnosis 1:  S/p rotator cuff  Repair  Decreased ROM/flexibility - home program  Decreased strength - home program  STG/LTGs have been met or progress has been made towards goals:  Yes (See Goal flow sheet completed today.)  Assessment of Progress: The patient has met all of their long term goals.  Self Management Plans:  Patient has been instructed in a home treatment program.  I have re-evaluated this patient and find that the nature, scope, duration and intensity of the therapy is appropriate for the medical condition of the patient.  Rika continues to require the following  intervention to meet STG and LTG's:  PT intervention is no longer required to meet STG/LTG.    Recommendations:  This patient is ready to be discharged from therapy and continue their home treatment program.            Thank you for your referral.    INQUIRIES  Therapist: Jax Lundy,PT, DPT, [facility]    [facility]  [address] [place] [address]. [address]  [facility] [address]  Phone: [phone]  Fax: [phone]

## 2018-07-30 NOTE — MR AVS SNAPSHOT
After Visit Summary   7/30/2018    Rika Connor    MRN: [id number]           Patient Information     Date Of Birth          1950        Visit Information        Provider Department      7/30/2018 11:00 AM Jax Lundy PT [facility]        Today's Diagnoses     S/P rotator cuff repair        Acute pain of right shoulder           Follow-ups after your visit        Who to contact     If you have questions or need follow up information about today's clinic visit or your schedule please contact [facility] directly at [phone].  Normal or non-critical lab and imaging results will be communicated to you by MyChart, letter or phone within 4 business days after the clinic has received the results. If you do not hear from us within 7 days, please contact the clinic through MyChart or phone. If you have a critical or abnormal lab result, we will notify you by phone as soon as possible.  Submit refill requests through [employer] or call your pharmacy and they will forward the refill request to us. Please allow 3 business days for your refill to be completed.          Additional Information About Your Visit        Care EveryWhere ID     This is your Care EveryWhere ID. This could be used by other organizations to access your [address] medical records  [id number]         Blood Pressure from Last 3 Encounters:   No data found for BP    Weight from Last 3 Encounters:   No data found for Wt              We Performed the Following     JUDI PROGRESS NOTES REPORT     NEUROMUSCULAR RE-EDUCATION     THERAPEUTIC EXERCISES        Primary Care Provider Fax #    [facility] [phone] [address] [address]        Equal Access to Services     LONDON LEA : Josselin Jarvis, nikita blank, qanegro blum  lashayla izquierdo. So [facility] [phone].    ATENCIÓN: Si habla cecilio, tiene a verde disposición servicios gratuitos de asistencia lingüística. Llame al [phone].    We comply with applicable federal civil rights laws and [place] laws. We do not discriminate on the basis of race, color, national origin, age, disability, sex, sexual orientation, or gender identity.            Thank you!     Thank you for choosing [facility]  for your care. Our goal is always to provide you with excellent care. Hearing back from our patients is one way we can continue to improve our services. Please take a few minutes to complete the written survey that you may receive in the mail after your visit with us. Thank you!             Your Updated Medication List - Protect others around you: Learn how to safely use, store and throw away your medicines at www.disposemymeds.org.      Notice  As of 7/30/2018  1:06 PM    You have not been prescribed any medications.

## 2018-12-10 ENCOUNTER — THERAPY VISIT (OUTPATIENT)
Dept: PHYSICAL THERAPY | Facility: CLINIC | Age: 68
End: 2018-12-10
Payer: MEDICARE

## 2018-12-10 DIAGNOSIS — M54.16 LUMBAR RADICULOPATHY: Primary | ICD-10-CM

## 2018-12-10 PROCEDURE — G8979 MOBILITY GOAL STATUS: HCPCS | Mod: GP | Performed by: PHYSICAL THERAPIST

## 2018-12-10 PROCEDURE — 97161 PT EVAL LOW COMPLEX 20 MIN: CPT | Mod: GP | Performed by: PHYSICAL THERAPIST

## 2018-12-10 PROCEDURE — 97140 MANUAL THERAPY 1/> REGIONS: CPT | Mod: GP | Performed by: PHYSICAL THERAPIST

## 2018-12-10 PROCEDURE — G8978 MOBILITY CURRENT STATUS: HCPCS | Mod: GP | Performed by: PHYSICAL THERAPIST

## 2018-12-10 NOTE — LETTER
"DEPARTMENT OF HEALTH AND HUMAN SERVICES  CENTERS FOR MEDICARE & MEDICAID SERVICES    PLAN/UPDATED PLAN OF PROGRESS FOR OUTPATIENT REHABILITATION    PATIENTS NAME:  Rika Connor     : 1950    PROVIDER NUMBER:    [id number]    [facility]       [facility] [place] [facility]    MEDICAL RECORD NUMBER: [id number]     START OF CARE DATE:  SOC Date: 12/10/18   TYPE:  PT    PRIMARY/TREATMENT DIAGNOSIS: (Pertinent Medical Diagnosis)  Lumbar radiculopathy    VISITS FROM START OF CARE:  Rxs Used: 1     [place] [facility] Initial Evaluation  Subjective:  Rohit has been dealing with low back pain since .  Original injury happened with bending forward to  some paper from to floor and she felt a \"tear\" in her low back.  Imaging showed herniated discs in low back.  She went through PT and was able to eliminate her pain with doing extension based exercises.  She did have some constant numbness in S1 dermatome after original injury.  The pain and numbness in L leg returned again around Oct 2018.  New imaging has shown herniated disc with pressure on L5 nerve root.  Now she has constant numbness in L foot.  She denies any weakness.  She does notice that she is limping if she walks ~30 min.        The history is provided by the patient. No  was used.   Rika Connor is a 68 year old female with a lumbar condition.  Condition occurred with:  Insidious onset.  Condition occurred: at home.  This is a recurrent condition     Patient reports pain:  Central lumbar spine.  Radiates to:  Foot left.  Pain is described as aching and is constant and reported as 2/10.  Associated symptoms:  Numbness, loss of balance and loss of motion/stiffness. Pain is the same all the time (related to activity - cleaning).  Symptoms are exacerbated by lifting, bending, walking and twisting and relieved by activity/movement (limiting lifting).  Since " onset symptoms are unchanged.  Special tests:  MRI (compression L L5 nerve root).  Previous treatment includes physical therapy.  There was significant improvement following previous treatment.  General health as reported by patient is good.  Pertinent medical history includes:  Overweight and high blood pressure.  Medical allergies: yes (penecillin).  Other surgeries include:  Orthopedic surgery (R rotator cuff).  Current medications:  High blood pressure medication (cholesterol).  Current occupation is Retired    Pt goal: improve walking tolerance, minimize numbness in leg (maximize balance)    .    Primary job tasks include:  Repetitive tasks, prolonged sitting and prolonged standing.    Barriers include:  None as reported by the patient.    Red flags:  None as reported by the patient.                        Objective:  System    Physical Exam    General     ROS  Rika Connor , : 1950, MRN: [id number]    Physical Therapy Objective Findings  Subjective information, goals, clinical impression, daily documentation and other information found in EPISODES tab.  Objective:     Lumbar Pain    Posture: flat thoracic spine, decreased lumbar lordosis  Gait:  normal  Lumbar Range of Motion:  Flexion                                               90%                                                                                                                            Extension 10%   Right Side Bending 25%   Left Side Bending 25%   Repeated extension- standing Improved lumbar extension, no change in numbness   Repeated flexion- standing No change with 20 reps     Pelvic screen:                                                                         Positive                                            Negative                                             Standing Forward Bend X L    Gillet(March) X L    Supine to sit     Sacroiliac provocation test     Pubic symphysis provocation            -resisted hip add at  45     Other:       Hip Screen:                                                                  Positive                                             Negative                                             Hip ROM     Oma CHRISTIANSON     Other:     Manual Muscle Testing (graded 0-5, measured at 0 degrees unless otherwise noted):                                                                              Right                                  Left                                                     Transversus Abdominus     -Kirk Leg Lowering (deg)     Hip Flex L2 4+ 4   Hip Abd     Hip Add     Hip Ext     Knee Flex 4- 4-   Knee Ext L3 5 5   Ankle Dorsiflexion L4 5 5   Great Toe Extension L5 5 5   Ankle Plantar Flexion S1 5 4   Other:     (+ mild pain, ++ moderate pain, +++ severe pain)    Special Tests:                                                                     Positive                                             Negative                                             Sign of Buttock  x   SLR  x   Terry Test  x   Ely Test X B    Prone instability Test  x   Crossover SLR X L    Repeated extension prone     Other: JOSELITO 3 min       Flexibility:                                                              Right                                                 Left                                                      Hamstring SLR 90 SLR 90   Hip flexor normal normal   Quadricep Prone kf 110 Prone kf 110   Radha's     piriformis normal normal   Other:            Segmental Mobility: hypomobile T6-T10    Palpation: L ASIS inf, L PSIS sup, R sacral sulcus deeper, L MARIA VICTORIA inf    -If symptoms past hip, must do neuro testing  Dermatome/Sensory  Testing: decreased L L5 and S1 dermatome  Reflex Testing:                                                                 Right                                                  Left                                                     Patellar Tendon normal normal   Achilles  Tendon normal Decreased   Babinski         Assessment/Plan:    Patient is a 68 year old female with lumbar complaints.    Patient has the following significant findings with corresponding treatment plan.                Diagnosis 1:  Low back pain consistent with L5 and S1 n root compression L  Pain -  hot/cold therapy, manual therapy, self management, education, directional preference exercise and home program  Decreased ROM/flexibility - manual therapy, therapeutic exercise, therapeutic activity and home program  Decreased joint mobility - manual therapy, therapeutic exercise, therapeutic activity and home program  Decreased strength - therapeutic exercise, therapeutic activities and home program  Decreased function - therapeutic activities and home program  Impaired posture - neuro re-education, therapeutic activities and home program    Therapy Evaluation Codes:   1) History comprised of:   Personal factors that impact the plan of care:      Age, Past/current experiences and Time since onset of symptoms.    Comorbidity factors that impact the plan of care are:      Osteoarthritis and Overweight.     Medications impacting care: None.  2) Examination of Body Systems comprised of:   Body structures and functions that impact the plan of care:      Lumbar spine.   Activity limitations that impact the plan of care are:      Bending, Lifting, Sitting, Standing and Walking.  3) Clinical presentation characteristics are:   Stable/Uncomplicated.  4) Decision-Making    Low complexity using standardized patient assessment instrument and/or measureable assessment of functional outcome.  Cumulative Therapy Evaluation is: Low complexity.    Previous and current functional limitations:  (See Goal Flow Sheet for this information)    Short term and Long term goals: (See Goal Flow Sheet for this information)     Communication ability:  Patient appears to be able to clearly communicate and understand verbal and written communication  "and follow directions correctly.  Treatment Explanation - The following has been discussed with the patient:   RX ordered/plan of care  Anticipated outcomes  Possible risks and side effects  This patient would benefit from PT intervention to resume normal activities.   Rehab potential is good.    Frequency:  1 X week, once daily  Duration:  for 6 weeks  Discharge Plan:  Achieve all LTG.  Independent in home treatment program.  Reach maximal therapeutic benefit.      Caregiver Signature/Credentials _____________________________ Date ________       Treating Provider: Jax Lundy DPT, OCS   I have reviewed and certified the need for these services and plan of treatment while under my care.        PHYSICIAN'S SIGNATURE:   _________________________________________  Date___________   Mahnaz Jeronimo MD    Certification period:  Beginning of Cert date period: 12/10/18 to  End of Cert period date: 02/18/19     Functional Level Progress Report: Please see attached \"Goal Flow sheet for Functional level.\"    ____X____ Continue Services or       ________ DC Services                Service dates: From  SOC Date: 12/10/18 date to present                         "

## 2018-12-10 NOTE — PROGRESS NOTES
"[facility] Initial Evaluation  Subjective:  Rohit has been dealing with low back pain since 1996.  Original injury happened with bending forward to  some paper from to floor and she felt a \"tear\" in her low back.  Imaging showed herniated discs in low back.  She went through PT and was able to eliminate her pain with doing extension based exercises.  She did have some constant numbness in S1 dermatome after original injury.  The pain and numbness in L leg returned again around Oct 2018.  New imaging has shown herniated disc with pressure on L5 nerve root.  Now she has constant numbness in L foot.  She denies any weakness.  She does notice that she is limping if she walks ~30 min.        The history is provided by the patient. No  was used.   Rika Connor is a 68 year old female with a lumbar condition.  Condition occurred with:  Insidious onset.  Condition occurred: at home.  This is a recurrent condition     Patient reports pain:  Central lumbar spine.  Radiates to:  Foot left.  Pain is described as aching and is constant and reported as 2/10.  Associated symptoms:  Numbness, loss of balance and loss of motion/stiffness. Pain is the same all the time (related to activity - cleaning).  Symptoms are exacerbated by lifting, bending, walking and twisting and relieved by activity/movement (limiting lifting).  Since onset symptoms are unchanged.  Special tests:  MRI (compression L L5 nerve root).  Previous treatment includes physical therapy.  There was significant improvement following previous treatment.  General health as reported by patient is good.  Pertinent medical history includes:  Overweight and high blood pressure.  Medical allergies: yes (penecillin).  Other surgeries include:  Orthopedic surgery (R rotator cuff).  Current medications:  High blood pressure medication (cholesterol).  Current occupation is Retired    Pt goal: improve walking tolerance, " minimize numbness in leg (maximize balance)    .    Primary job tasks include:  Repetitive tasks, prolonged sitting and prolonged standing.    Barriers include:  None as reported by the patient.    Red flags:  None as reported by the patient.                        Objective:  System    Physical Exam    General     ROS  Rika Connor , : 1950, MRN: [id number]    Physical Therapy Objective Findings  Subjective information, goals, clinical impression, daily documentation and other information found in EPISODES tab.  Objective:     Lumbar Pain    Posture: flat thoracic spine, decreased lumbar lordosis  Gait:  normal  Lumbar Range of Motion:  Flexion                                               90%                                                                                                                            Extension 10%   Right Side Bending 25%   Left Side Bending 25%   Repeated extension- standing Improved lumbar extension, no change in numbness   Repeated flexion- standing No change with 20 reps     Pelvic screen:                                                                         Positive                                            Negative                                             Standing Forward Bend X L    Gillet(March) X L    Supine to sit     Sacroiliac provocation test     Pubic symphysis provocation            -resisted hip add at 47     Other:       Hip Screen:                                                                  Positive                                             Negative                                             Hip ROM     Oma CHRISTIANSON     Other:     Manual Muscle Testing (graded 0-5, measured at 0 degrees unless otherwise noted):                                                                              Right                                  Left                                                     Transversus Abdominus     -Kirk Leg  Lowering (deg)     Hip Flex L2 4+ 4   Hip Abd     Hip Add     Hip Ext     Knee Flex 4- 4-   Knee Ext L3 5 5   Ankle Dorsiflexion L4 5 5   Great Toe Extension L5 5 5   Ankle Plantar Flexion S1 5 4   Other:     (+ mild pain, ++ moderate pain, +++ severe pain)    Special Tests:                                                                     Positive                                             Negative                                             Sign of Buttock  x   SLR  x   Terry Test  x   Ely Test X B    Prone instability Test  x   Crossover SLR X L    Repeated extension prone     Other: JOSELITO 3 min       Flexibility:                                                              Right                                                 Left                                                      Hamstring SLR 90 SLR 90   Hip flexor normal normal   Quadricep Prone kf 110 Prone kf 110   Radha's     piriformis normal normal   Other:            Segmental Mobility: hypomobile T6-T10    Palpation: L ASIS inf, L PSIS sup, R sacral sulcus deeper, L MARIA VICTORIA inf    -If symptoms past hip, must do neuro testing  Dermatome/Sensory  Testing: decreased L L5 and S1 dermatome  Reflex Testing:                                                                 Right                                                  Left                                                     Patellar Tendon normal normal   Achilles Tendon normal Decreased   Babinski         Assessment/Plan:    Patient is a 68 year old female with lumbar complaints.    Patient has the following significant findings with corresponding treatment plan.                Diagnosis 1:  Low back pain consistent with L5 and S1 n root compression L  Pain -  hot/cold therapy, manual therapy, self management, education, directional preference exercise and home program  Decreased ROM/flexibility - manual therapy, therapeutic exercise, therapeutic activity and home program  Decreased joint mobility -  manual therapy, therapeutic exercise, therapeutic activity and home program  Decreased strength - therapeutic exercise, therapeutic activities and home program  Decreased function - therapeutic activities and home program  Impaired posture - neuro re-education, therapeutic activities and home program    Therapy Evaluation Codes:   1) History comprised of:   Personal factors that impact the plan of care:      Age, Past/current experiences and Time since onset of symptoms.    Comorbidity factors that impact the plan of care are:      Osteoarthritis and Overweight.     Medications impacting care: None.  2) Examination of Body Systems comprised of:   Body structures and functions that impact the plan of care:      Lumbar spine.   Activity limitations that impact the plan of care are:      Bending, Lifting, Sitting, Standing and Walking.  3) Clinical presentation characteristics are:   Stable/Uncomplicated.  4) Decision-Making    Low complexity using standardized patient assessment instrument and/or measureable assessment of functional outcome.  Cumulative Therapy Evaluation is: Low complexity.    Previous and current functional limitations:  (See Goal Flow Sheet for this information)    Short term and Long term goals: (See Goal Flow Sheet for this information)     Communication ability:  Patient appears to be able to clearly communicate and understand verbal and written communication and follow directions correctly.  Treatment Explanation - The following has been discussed with the patient:   RX ordered/plan of care  Anticipated outcomes  Possible risks and side effects  This patient would benefit from PT intervention to resume normal activities.   Rehab potential is good.    Frequency:  1 X week, once daily  Duration:  for 6 weeks  Discharge Plan:  Achieve all LTG.  Independent in home treatment program.  Reach maximal therapeutic benefit.    Please refer to the daily flowsheet for treatment today, total treatment time  and time spent performing 1:1 timed codes.     Jax Lundy,PT, DPT, OCS

## 2018-12-10 NOTE — LETTER
"DEPARTMENT OF HEALTH AND HUMAN SERVICES  CENTERS FOR MEDICARE & MEDICAID SERVICES    PLAN/UPDATED PLAN OF PROGRESS FOR OUTPATIENT REHABILITATION    PATIENTS NAME:  Rika Connor     : 1950    PROVIDER NUMBER:    [id number]    HICN:   [id number]    PROVIDER NAME: [facility]    MEDICAL RECORD NUMBER: [id number]     START OF CARE DATE:  SOC Date: 12/10/18   TYPE:  PT    PRIMARY/TREATMENT DIAGNOSIS: (Pertinent Medical Diagnosis)  Lumbar radiculopathy    VISITS FROM START OF CARE:  Rxs Used: 1     [place] [facility] Initial Evaluation  Subjective:  Rohit has been dealing with low back pain since .  Original injury happened with bending forward to  some paper from to floor and she felt a \"tear\" in her low back.  Imaging showed herniated discs in low back.  She went through PT and was able to eliminate her pain with doing extension based exercises.  She did have some constant numbness in S1 dermatome after original injury.  The pain and numbness in L leg returned again around Oct 2018.  New imaging has shown herniated disc with pressure on L5 nerve root.  Now she has constant numbness in L foot.  She denies any weakness.  She does notice that she is limping if she walks ~30 min.      The history is provided by the patient. No  was used.   Rika Connor is a 68 year old female with a lumbar condition.  Condition occurred with:  Insidious onset.  Condition occurred: at home.  This is a recurrent condition     Patient reports pain:  Central lumbar spine.  Radiates to:  Foot left.  Pain is described as aching and is constant and reported as 2/10.  Associated symptoms:  Numbness, loss of balance and loss of motion/stiffness. Pain is the same all the time (related to activity - cleaning).  Symptoms are exacerbated by lifting, bending, walking and twisting and relieved by activity/movement (limiting lifting).  Since " onset symptoms are unchanged.  Special tests:  MRI (compression L L5 nerve root).  Previous treatment includes physical therapy.  There was significant improvement following previous treatment.  General health as reported by patient is good.  Pertinent medical history includes:  Overweight and high blood pressure.  Medical allergies: yes (penecillin).  Other surgeries include:  Orthopedic surgery (R rotator cuff).  Current medications:  High blood pressure medication (cholesterol).  Current occupation is Retired    Pt goal: improve walking tolerance, minimize numbness in leg (maximize balance)  Primary job tasks include:  Repetitive tasks, prolonged sitting and prolonged standing.    Barriers include:  None as reported by the patient.  Red flags:  None as reported by the patient.  Objective:    Rika OWENS Nikolas , : 1950, MRN: [id number]    Physical Therapy Objective Findings  Subjective information, goals, clinical impression, daily documentation and other information found in EPISODES tab.  Objective:     Lumbar Pain    Posture: flat thoracic spine, decreased lumbar lordosis  Gait:  normal  Lumbar Range of Motion:  Flexion                                               90%                                                                                                                            Extension 10%   Right Side Bending 25%   Left Side Bending 25%   Repeated extension- standing Improved lumbar extension, no change in numbness   Repeated flexion- standing No change with 20 reps     Pelvic screen:                                                                         Positive                                            Negative                                             Standing Forward Bend X L    Gillet(March) X L    Supine to sit     Sacroiliac provocation test     Pubic symphysis provocation            -resisted hip add at 47     Other:       Hip Screen:                                                                   Positive                                             Negative                                             Hip ROM     Oma CHRISTIANSON     Other:     Manual Muscle Testing (graded 0-5, measured at 0 degrees unless otherwise noted):                                                                              Right                                  Left                                                     Transversus Abdominus     -Kirk Leg Lowering (deg)     Hip Flex L2 4+ 4   Hip Abd     Hip Add     Hip Ext     Knee Flex 4- 4-   Knee Ext L3 5 5   Ankle Dorsiflexion L4 5 5   Great Toe Extension L5 5 5   Ankle Plantar Flexion S1 5 4   Other:     (+ mild pain, ++ moderate pain, +++ severe pain)    Special Tests:                                                                     Positive                                             Negative                                             Sign of Buttock  x   SLR  x   Terry Test  x   Ely Test X B    Prone instability Test  x   Crossover SLR X L    Repeated extension prone     Other: JOSELITO 3 min       Flexibility:                                                              Right                                                 Left                                                      Hamstring SLR 90 SLR 90   Hip flexor normal normal   Quadricep Prone kf 110 Prone kf 110   Radha's     piriformis normal normal   Other:            Segmental Mobility: hypomobile T6-T10    Palpation: L ASIS inf, L PSIS sup, R sacral sulcus deeper, L MARIA VICTORIA inf    -If symptoms past hip, must do neuro testing  Dermatome/Sensory  Testing: decreased L L5 and S1 dermatome  Reflex Testing:                                                                 Right                                                  Left                                                     Patellar Tendon normal normal   Achilles Tendon normal Decreased   Babinski       Assessment/Plan:    Patient is  a 68 year old female with lumbar complaints.    Patient has the following significant findings with corresponding treatment plan.                Diagnosis 1:  Low back pain consistent with L5 and S1 n root compression L  Pain -  hot/cold therapy, manual therapy, self management, education, directional preference exercise and home program  Decreased ROM/flexibility - manual therapy, therapeutic exercise, therapeutic activity and home program  Decreased joint mobility - manual therapy, therapeutic exercise, therapeutic activity and home program  Decreased strength - therapeutic exercise, therapeutic activities and home program  Decreased function - therapeutic activities and home program  Impaired posture - neuro re-education, therapeutic activities and home program    Therapy Evaluation Codes:   1) History comprised of:   Personal factors that impact the plan of care:      Age, Past/current experiences and Time since onset of symptoms.    Comorbidity factors that impact the plan of care are:      Osteoarthritis and Overweight.     Medications impacting care: None.  2) Examination of Body Systems comprised of:   Body structures and functions that impact the plan of care:      Lumbar spine.   Activity limitations that impact the plan of care are:      Bending, Lifting, Sitting, Standing and Walking.  3) Clinical presentation characteristics are:   Stable/Uncomplicated.  4) Decision-Making    Low complexity using standardized patient assessment instrument and/or measureable assessment of functional outcome.  Cumulative Therapy Evaluation is: Low complexity.    Previous and current functional limitations:  (See Goal Flow Sheet for this information)    Short term and Long term goals: (See Goal Flow Sheet for this information)     Communication ability:  Patient appears to be able to clearly communicate and understand verbal and written communication and follow directions correctly.  Treatment Explanation - The following  "has been discussed with the patient:   RX ordered/plan of care  Anticipated outcomes  Possible risks and side effects  This patient would benefit from PT intervention to resume normal activities.   Rehab potential is good.    Frequency:  1 X week, once daily  Duration:  for 6 weeks  Discharge Plan:  Achieve all LTG.  Independent in home treatment program.  Reach maximal therapeutic benefit.    Caregiver Signature/Credentials _____________________________ Date ________       Treating Provider: Jax Lundy DPT, OCS   I have reviewed and certified the need for these services and plan of treatment while under my care.        PHYSICIAN'S SIGNATURE:   _________________________________________  Date___________   Mahnaz Jeronimo MD    Certification period:  Beginning of Cert date period: 12/10/18 to  End of Cert period date: 02/18/19     Functional Level Progress Report: Please see attached \"Goal Flow sheet for Functional level.\"    ____X____ Continue Services or       ________ DC Services                Service dates: From  SOC Date: 12/10/18 date to present                         "

## 2018-12-17 ENCOUNTER — THERAPY VISIT (OUTPATIENT)
Dept: PHYSICAL THERAPY | Facility: CLINIC | Age: 68
End: 2018-12-17
Payer: MEDICARE

## 2018-12-17 DIAGNOSIS — M54.16 LUMBAR RADICULOPATHY: ICD-10-CM

## 2018-12-17 PROCEDURE — 97140 MANUAL THERAPY 1/> REGIONS: CPT | Mod: KX | Performed by: PHYSICAL THERAPIST

## 2018-12-17 PROCEDURE — 97110 THERAPEUTIC EXERCISES: CPT | Mod: KX | Performed by: PHYSICAL THERAPIST

## 2018-12-27 ENCOUNTER — THERAPY VISIT (OUTPATIENT)
Dept: PHYSICAL THERAPY | Facility: CLINIC | Age: 68
End: 2018-12-27
Payer: MEDICARE

## 2018-12-27 DIAGNOSIS — M54.16 LUMBAR RADICULOPATHY: ICD-10-CM

## 2018-12-27 PROCEDURE — 97112 NEUROMUSCULAR REEDUCATION: CPT | Mod: GP | Performed by: PHYSICAL THERAPIST

## 2018-12-27 PROCEDURE — 97110 THERAPEUTIC EXERCISES: CPT | Mod: GP | Performed by: PHYSICAL THERAPIST

## 2019-01-15 ENCOUNTER — THERAPY VISIT (OUTPATIENT)
Dept: PHYSICAL THERAPY | Facility: CLINIC | Age: 69
End: 2019-01-15
Payer: MEDICARE

## 2019-01-15 DIAGNOSIS — M54.16 LUMBAR RADICULOPATHY: ICD-10-CM

## 2019-01-15 PROCEDURE — 97110 THERAPEUTIC EXERCISES: CPT | Mod: GP | Performed by: PHYSICAL THERAPIST

## 2019-01-15 PROCEDURE — 97112 NEUROMUSCULAR REEDUCATION: CPT | Mod: GP | Performed by: PHYSICAL THERAPIST

## 2019-01-15 NOTE — LETTER
[facility] ATHLETIC [facility] PHYSICAL [facility]  [address]  [facility] [address] [phone]    2019    Re: Rika Connor   :   1950  MRN:  [id number]   REFERRING PHYSICIAN:   Mahnaz Jeronimo    [facility]  Date of Initial Evaluation:  12/10/18  Visits:  Rxs Used: 4  Reason for Referral:  Lumbar radiculopathy    Oswestry Score: 2.22 %                   DISCHARGE REPORT    Progress reporting period is from 12/10/18 to 1/15/19.       SUBJECTIVE  Subjective changes noted by patient:  minimal to no pain in low back, still having some numbness in her L foot, her calf muscle is slowly getting stronger, no issues with walking, no problems sleeping     Current Pain level: 0/10(worst 1/10).     Previous pain level was  NA Initial Pain level: 2/10.   Changes in function:  Yes (See Goal flowsheet attached for changes in current functional level)  Adverse reaction to treatment or activity: None    OBJECTIVE  Changes noted in objective findings:    Objective: single limb calf raise: 100# at 10 reps, R difficulty 4/10, L difficulty 6/10, lumbar ROM: flex 100%, ext 50%, R SB 66%, L SB 66%, SLR R 100, L 100, no change with JOSELITO, iso bridge: 3:00/3:00 difficulty 2/5, double leg lowering 75%, decreased sensation light touch L S1 dermatome     ASSESSMENT/PLAN  Updated problem list and treatment plan: Diagnosis 1:  Low back pain consistent with L5 and S1 n root compression L  Pain -  home program  Decreased ROM/flexibility - home program  Decreased strength - home program  STG/LTGs have been met or progress has been made towards goals:  Yes (See Goal flow sheet completed today.)  Assessment of Progress: The patient has met all of their long term goals.  Self Management Plans:  Patient has been instructed in a home treatment program.  I have re-evaluated this patient and find that the nature, scope, duration and intensity of the  therapy is appropriate for the medical condition of the patient.  Rika continues to require the following intervention to meet STG and LTG's:  PT intervention is no longer required to meet STG/LTG.    Recommendations:  This patient is ready to be discharged from therapy and continue their home treatment program.      Thank you for your referral.    INQUIRIES  Therapist:  Jax Lundy,PT, DPT, [facility]    [facility]  [address] [place], [place] [address]  [facility] [address]  Phone: [phone]  Fax: [phone]

## 2019-01-15 NOTE — PROGRESS NOTES
Subjective:  HPI  Oswestry Score: 2.22 %                 Objective:  System    Physical Exam    General     ROS    Assessment/Plan:    DISCHARGE REPORT    Progress reporting period is from 12/10/18 to 1/15/19.       SUBJECTIVE  Subjective changes noted by patient:  minimal to no pain in low back, still having some numbness in her L foot, her calf muscle is slowly getting stronger, no issues with walking, no problems sleeping     Current Pain level: 0/10(worst 1/10).     Previous pain level was  NA Initial Pain level: 2/10.   Changes in function:  Yes (See Goal flowsheet attached for changes in current functional level)  Adverse reaction to treatment or activity: None    OBJECTIVE  Changes noted in objective findings:    Objective: single limb calf raise: 100# at 10 reps, R difficulty 4/10, L difficulty 6/10, lumbar ROM: flex 100%, ext 50%, R SB 66%, L SB 66%, SLR R 100, L 100, no change with JOSELITO, iso bridge: 3:00/3:00 difficulty 2/5, double leg lowering 75%, decreased sensation light touch L S1 dermatome     ASSESSMENT/PLAN  Updated problem list and treatment plan: Diagnosis 1:  Low back pain consistent with L5 and S1 n root compression L  Pain -  home program  Decreased ROM/flexibility - home program  Decreased strength - home program  STG/LTGs have been met or progress has been made towards goals:  Yes (See Goal flow sheet completed today.)  Assessment of Progress: The patient has met all of their long term goals.  Self Management Plans:  Patient has been instructed in a home treatment program.  I have re-evaluated this patient and find that the nature, scope, duration and intensity of the therapy is appropriate for the medical condition of the patient.  Rika continues to require the following intervention to meet STG and LTG's:  PT intervention is no longer required to meet STG/LTG.    Recommendations:  This patient is ready to be discharged from therapy and continue their home treatment program.    Please  refer to the daily flowsheet for treatment today, total treatment time and time spent performing 1:1 timed codes.      Jax Lundy,PT, DPT, OCS

## 2019-04-26 PROBLEM — M54.16 LUMBAR RADICULOPATHY: Status: RESOLVED | Noted: 2018-12-10 | Resolved: 2019-04-26

## 2020-01-21 NOTE — MR AVS SNAPSHOT
After Visit Summary   11/27/2017    Rika Connor    MRN: [id number]           Patient Information     Date Of Birth          1950        Visit Information        Provider Department      11/27/2017 2:10 PM Jax Lundy, PT [facility] Physical Therapy        Today's Diagnoses     Acute pain of right shoulder           Follow-ups after your visit        Your next 10 appointments already scheduled     Dec 04, 2017  4:50 PM CST   JUDI Extremity with Jax Lundy PT   [facility] ([facility]  )    [address]  [facility] [address]   [phone]            Dec 11, 2017  9:40 AM CST   JUDI Extremity with Jax Lundy PT   [facility] ([facility]  )    [address]  [facility] [address]   [phone]            Dec 18, 2017  9:40 AM CST   JUDI Extremity with Jax Lnudy PT   [facility] ([facility]  )    [address]  [facility] [address]   [phone]              Who to contact     If you have questions or need follow up information about today's clinic visit or your schedule please contact [facility] directly at [phone].  Normal or non-critical lab and imaging results will be communicated to you by MyChart, letter or phone within 4 business days after the clinic has received the results. If you do not hear from us within 7 days, please contact the clinic through GetMeMediahart or phone. If you have a critical or abnormal lab result, we will notify you by phone as soon as possible.  Submit refill requests through Feeligo or call your pharmacy and they will forward the refill request to us. Please allow 3 business days for your refill to be completed.          Additional Information About Your  "Visit        travaylhart Information     Acusphere lets you send messages to your doctor, view your test results, renew your prescriptions, schedule appointments and more. To sign up, go to [email or web address] . Click on \"Log in\" on the left side of the screen, which will take you to the Welcome page. Then click on \"Sign up Now\" on the right side of the page.     You will be asked to enter the access code listed below, as well as some personal information. Please follow the directions to create your username and password.     Your access code is: [email or web address]  Expires: 2018  2:53 PM     Your access code will  in 90 days. If you need help or a new code, please call your [facility] clinic or [phone].        Care EveryWhere ID     This is your Care EveryWhere ID. This could be used by other organizations to access your [facility] medical records  [id number]         Blood Pressure from Last 3 Encounters:   No data found for BP    Weight from Last 3 Encounters:   No data found for Wt              We Performed the Following     JUDI PROGRESS NOTES REPORT     MANUAL THER TECH,1+REGIONS,EA 15 MIN     NEUROMUSCULAR RE-EDUCATION     THERAPEUTIC EXERCISES        Primary Care Provider Fax #    [facility] [phone]       [address] [facility] [address]        Equal Access to Services     LONDON LEA : Hadii clair ku hadasho Soomaali, waaxda luqadaha, qaybta kaalmada adeegyada, negro coelho [place] pappas . So [facility] [place] [phone].    ATENCIÓN: Si habla español, tiene a verde disposición servicios gratuitos de asistencia lingüística. Llame al [phone].    We comply with applicable federal civil rights laws and [place] laws. We do not discriminate on the basis of race, color, national origin, age, disability, sex, sexual orientation, or gender identity.            Thank you!     Thank you for choosing [facility]  for your care. " Our goal is always to provide you with excellent care. Hearing back from our patients is one way we can continue to improve our services. Please take a few minutes to complete the written survey that you may receive in the mail after your visit with us. Thank you!             Your Updated Medication List - Protect others around you: Learn how to safely use, store and throw away your medicines at www.disposemymeds.org.      Notice  As of 11/27/2017  3:08 PM    You have not been prescribed any medications.       no